# Patient Record
Sex: FEMALE | Race: OTHER | Employment: OTHER | URBAN - METROPOLITAN AREA
[De-identification: names, ages, dates, MRNs, and addresses within clinical notes are randomized per-mention and may not be internally consistent; named-entity substitution may affect disease eponyms.]

---

## 2017-01-24 ENCOUNTER — HOSPITAL ENCOUNTER (OUTPATIENT)
Dept: LAB | Age: 67
Discharge: HOME | End: 2017-01-24
Payer: MEDICARE

## 2017-01-24 ENCOUNTER — OFFICE VISIT (OUTPATIENT)
Dept: ONCOLOGY | Age: 67
End: 2017-01-24

## 2017-01-24 ENCOUNTER — HOSPITAL ENCOUNTER (OUTPATIENT)
Dept: ONCOLOGY | Age: 67
Discharge: HOME | End: 2017-01-24

## 2017-01-24 VITALS
SYSTOLIC BLOOD PRESSURE: 114 MMHG | HEIGHT: 62 IN | TEMPERATURE: 97.7 F | WEIGHT: 145 LBS | BODY MASS INDEX: 26.68 KG/M2 | HEART RATE: 44 BPM | DIASTOLIC BLOOD PRESSURE: 74 MMHG

## 2017-01-24 DIAGNOSIS — I86.4 GASTRIC VARICES: ICD-10-CM

## 2017-01-24 DIAGNOSIS — D68.69 ACQUIRED THROMBOPHILIA (HCC): ICD-10-CM

## 2017-01-24 DIAGNOSIS — I81 PORTAL VEIN THROMBOSIS: ICD-10-CM

## 2017-01-24 DIAGNOSIS — D68.59 PROTEIN C DEFICIENCY (HCC): ICD-10-CM

## 2017-01-24 DIAGNOSIS — K76.6 PORTAL HYPERTENSION WITH ESOPHAGEAL VARICES (HCC): ICD-10-CM

## 2017-01-24 DIAGNOSIS — I85.00 PORTAL HYPERTENSION WITH ESOPHAGEAL VARICES (HCC): ICD-10-CM

## 2017-01-24 DIAGNOSIS — D72.819 LEUKOPENIA, UNSPECIFIED TYPE: ICD-10-CM

## 2017-01-24 DIAGNOSIS — D72.819 CHRONIC LEUKOPENIA: ICD-10-CM

## 2017-01-24 DIAGNOSIS — D72.819 LEUKOPENIA, UNSPECIFIED TYPE: Primary | ICD-10-CM

## 2017-01-24 LAB
ALBUMIN SERPL BCP-MCNC: 4.3 G/DL (ref 3.4–5)
ALBUMIN/GLOB SERPL: 2 {RATIO} (ref 0.8–1.7)
ALP SERPL-CCNC: 113 U/L (ref 45–117)
ALT SERPL-CCNC: 51 U/L (ref 13–56)
ANION GAP BLD CALC-SCNC: 7 MMOL/L (ref 3–18)
AST SERPL W P-5'-P-CCNC: 32 U/L (ref 15–37)
BASO+EOS+MONOS # BLD AUTO: 0.1 K/UL (ref 0–2.3)
BASO+EOS+MONOS # BLD AUTO: 3 % (ref 0.1–17)
BILIRUB SERPL-MCNC: 1.1 MG/DL (ref 0.2–1)
BUN SERPL-MCNC: 14 MG/DL (ref 7–18)
BUN/CREAT SERPL: 22 (ref 12–20)
CALCIUM SERPL-MCNC: 9.1 MG/DL (ref 8.5–10.1)
CHLORIDE SERPL-SCNC: 105 MMOL/L (ref 100–108)
CO2 SERPL-SCNC: 27 MMOL/L (ref 21–32)
CREAT SERPL-MCNC: 0.64 MG/DL (ref 0.6–1.3)
DIFFERENTIAL METHOD BLD: ABNORMAL
ERYTHROCYTE [DISTWIDTH] IN BLOOD BY AUTOMATED COUNT: 13.3 % (ref 11.5–14.5)
GLOBULIN SER CALC-MCNC: 2.2 G/DL (ref 2–4)
GLUCOSE SERPL-MCNC: 99 MG/DL (ref 74–99)
HCT VFR BLD AUTO: 42.6 % (ref 36–48)
HGB BLD-MCNC: 13.8 G/DL (ref 12–16)
LYMPHOCYTES # BLD AUTO: 35 % (ref 14–44)
LYMPHOCYTES # BLD: 1.5 K/UL (ref 1.1–5.9)
MCH RBC QN AUTO: 29.1 PG (ref 25–35)
MCHC RBC AUTO-ENTMCNC: 32.4 G/DL (ref 31–37)
MCV RBC AUTO: 89.9 FL (ref 78–102)
NEUTS SEG # BLD: 2.6 K/UL (ref 1.8–9.5)
NEUTS SEG NFR BLD AUTO: 62 % (ref 40–70)
PLATELET # BLD AUTO: 252 K/UL (ref 140–440)
POTASSIUM SERPL-SCNC: 4.6 MMOL/L (ref 3.5–5.5)
PROT SERPL-MCNC: 6.5 G/DL (ref 6.4–8.2)
RBC # BLD AUTO: 4.74 M/UL (ref 4.1–5.1)
SODIUM SERPL-SCNC: 139 MMOL/L (ref 136–145)
WBC # BLD AUTO: 4.2 K/UL (ref 4.5–13)

## 2017-01-24 PROCEDURE — 80053 COMPREHEN METABOLIC PANEL: CPT | Performed by: INTERNAL MEDICINE

## 2017-01-24 PROCEDURE — 36415 COLL VENOUS BLD VENIPUNCTURE: CPT | Performed by: INTERNAL MEDICINE

## 2017-01-24 NOTE — PROGRESS NOTES
Hematology/Oncology  Progress Note    Name: Berna Shaikh  Date: 2017  : 1950    PCP: Waleska Bethea M.D.     Ms. Hemant Arauz is a 74year old female who was seen for management of her acquired thrombophilic disorder. The patient also has portal hypertension with esophageal varices, coronary artery disease, and borderline diabetes mellitus. Current therapy: None    Subjective:     Mrs. Hemant Arauz is a 58-year-old [facility] [place] woman who has a thrombophilia disorder characterized by decreased and her protein C antigen and functional protein C levels. She also has a history of portal vein thrombosis. She has been doing well since she was last seen in clinic. At this time, she denies having abdominal pain, nausea, emesis, or diarrhea. She has multiple medical problems and is concerned about her cholesterol level, thyroid function,, and blood sugar level. She has previously had a myocardial infarction and is very interested in her lipid profile. The patient  also has a history of TIA as well. 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 Today she is complaining of some recurrent lower abdominal pain which extends from the left lower quadrant over to the right lower quadrant. She is currently taking Bentyl and states that she gets the best relief from using extra strength Tylenol. She is tentatively scheduled to have a followup visit with the gastroenterologist for further assessment. She denies having a blood in the stool. There are no other complaints or concerns reported. No past medical history on file. Past Surgical History   Procedure Laterality Date    Hx acl reconstruction       Social History     Social History    Marital status:      Spouse name: N/A    Number of children: N/A    Years of education: N/A     Occupational History    Not on file.      Social History Main Topics    Smoking status: Never Smoker  Smokeless tobacco: Never Used    Alcohol use Yes      Comment: ocassionally    Drug use: Not on file    Sexual activity: Not on file     Other Topics Concern    Not on file     Social History Narrative    No narrative on file     No family history on file. Current Outpatient Prescriptions   Medication Sig Dispense Refill    atorvastatin (LIPITOR) 20 mg tablet Take  by mouth daily.  DOCOSAHEXANOIC ACID/EPA (FISH OIL PO) Take  by mouth.  Flaxseed Oil Oil by Does Not Apply route.  cyanocobalamin (VITAMIN B12) 1,000 mcg/mL injection 1,000 mcg by IntraMUSCular route once.  cholecalciferol, vitamin D3, (VITAMIN D3) 2,000 unit Tab Take  by mouth.  aspirin 81 mg chewable tablet Take 81 mg by mouth daily.  montelukast (SINGULAIR) 10 mg tablet Take 10 mg by mouth daily.  metoprolol-XL (TOPROL XL) 25 mg XL tablet Take  by mouth daily.  docusate sodium (COLACE) 100 mg capsule Take 100 mg by mouth two (2) times a day. Review of Systems  Constitutional: The patient has no acute distress or discomfort. HEENT: The patient denies recent head trauma, eye pain, blurred vision,  hearing deficit, oropharyngeal mucosal pain or lesions, and the patient denies throat pain or discomfort. Lymphatics: The patient denies palpable peripheral lymphadenopathy. Hematologic: The patient denies having bruising, bleeding, or progressive fatigue. Respiratory: Patient denies having shortness of breath, cough, sputum production, fever, or dyspnea on exertion. Cardiovascular: The patient denies having leg pain, leg swelling, heart palpitations, chest permit, chest pain, or lightheadedness. The patient denies having dyspnea on exertion. Gastrointestinal: The patient denies having nausea, emesis, or diarrhea. The patient denies having any hematemesis or blood in the stool. Genitourinary: Patient denies having urinary urgency, frequency, or dysuria.   The patient denies having blood in the urine. Psychological: The patient denies having symptoms of nervousness, anxiety, depression, or thoughts of harming himself some of this. Skin: Patient denies having skin rashes, skin, ulcerations, or unexplained itching or pruritus. Musculoskeletal: The patient denies having pain in the joints, tenderness, or bones. Objective:     Visit Vitals    /74    Pulse (!) 44    Temp 97.7 °F (36.5 °C)    Ht 5' 2\" (1.575 m)    Wt 65.8 kg (145 lb)    BMI 26.52 kg/m2     ECOG PS=0     Physical Exam:   Gen. Appearance: The patient is in no acute distress. Skin: There is no bruise or rash. HEENT: The exam is unremarkable. Neck: Supple without lymphadenopathy or thyromegaly. Lungs: Clear to auscultation and percussion; there are no wheezes or rhonchi. Heart: Regular rate and rhythm; there are no murmurs, gallops, or rubs. Anterior chest wall and breast: Deferred. Abdomen: Bowel sounds are present and normal.  There is no guarding, tenderness, or hepatosplenomegaly. Extremities: There is no clubbing, cyanosis, or edema. Neurologic: There are no focal neurologic deficits. Lymphatics: There is no palpable peripheral lymphadenopathy. Lab data: The CBC from from today, 1/24/2017, showed a WBC count of 4.2, hemoglobin 13.8 g/dL, hematocrit 42.6%, and the platelet count was 455,106. Assessment:     1. Leukopenia, unspecified type    2. Portal vein thrombosis    3. Chronic leukopenia    4. Protein C deficiency ([address]    5. Acquired thrombophilia ([facility] [address]      The most recent protein C antigen level was 70%    Plan:   Portal vein thrombosis/hypertension with esophageal varices: The clinical exam shows no evidence of recurrent pain or discomfort to suggest any evidence of recurrent splanchnic vessel thrombosis. A comprehensive metabolic panel with liver function enzymes will be checked at this time.     Thrombophilia/acquired thrombophilia/protein C deficiency: The studies  indicate that she has an absolute protein C deficiency based on the most recent lab data. I have explained to the patient that if she develops  Any type of recurrent thromboembolic events, she will need to remain on anticoagulation indefinitely from that point on. Recent CT scan of her abdomen did not show any evidence of blood clots. The most recent lab tests from January 26, 2016 showed that her protein C level had increased to 70%. Gastric/esophageal varices(stable problem): The patient previously had an evaluation by the gastroenterologist and the variceal findings were without evidence of bleeding. Leukopenia : I have explained to the patient that her previously recognized leukopenia has resolved with the current WBC count in the normal range at 4.7 with an absolute neutrophil count of 3.3. Absolute lymphocyte count remains low at 1%. The flow cytometry from January 26, 2016 revealed no evidence of immunophenotypic abnormalities and the leukocyte populations. We will continue to monitor the leukocyte counts at six-month intervals in the future. Abdominal discomfort (new problem): The patient is currently taking Bentyl and using Tylenol extra strength with some relief. I have encouraged the patient to follow with the gastroenterologist for further assessment.     The patient will return to the clinic for a complete assessment again in 6 months  Orders Placed This Encounter    COMPLETE CBC & AUTO DIFF WBC    InHouse CBC ([employer])     Standing Status:   Future     Number of Occurrences:   1     Standing Expiration Date:   1/31/2017       Gilmar Lebron MD  1/24/2017

## 2017-08-22 ENCOUNTER — HOSPITAL ENCOUNTER (OUTPATIENT)
Dept: ONCOLOGY | Age: 67
Discharge: HOME | End: 2017-08-22

## 2017-08-22 ENCOUNTER — OFFICE VISIT (OUTPATIENT)
Dept: ONCOLOGY | Age: 67
End: 2017-08-22

## 2017-08-22 VITALS
HEART RATE: 45 BPM | DIASTOLIC BLOOD PRESSURE: 64 MMHG | TEMPERATURE: 98.1 F | WEIGHT: 146 LBS | SYSTOLIC BLOOD PRESSURE: 120 MMHG | BODY MASS INDEX: 26.7 KG/M2

## 2017-08-22 DIAGNOSIS — D72.819 CHRONIC LEUKOPENIA: ICD-10-CM

## 2017-08-22 DIAGNOSIS — D72.819 LEUKOPENIA, UNSPECIFIED TYPE: Primary | ICD-10-CM

## 2017-08-22 DIAGNOSIS — D68.59 PROTEIN C DEFICIENCY (HCC): ICD-10-CM

## 2017-08-22 DIAGNOSIS — I81 PORTAL VEIN THROMBOSIS: ICD-10-CM

## 2017-08-22 DIAGNOSIS — D72.819 LEUKOPENIA, UNSPECIFIED TYPE: ICD-10-CM

## 2017-08-22 LAB
BASO+EOS+MONOS # BLD AUTO: 0.6 K/UL (ref 0–2.3)
BASO+EOS+MONOS # BLD AUTO: 10 % (ref 0.1–17)
DIFFERENTIAL METHOD BLD: NORMAL
ERYTHROCYTE [DISTWIDTH] IN BLOOD BY AUTOMATED COUNT: 13.3 % (ref 11.5–14.5)
HCT VFR BLD AUTO: 43.4 % (ref 36–48)
HGB BLD-MCNC: 14 G/DL (ref 12–16)
LYMPHOCYTES # BLD: 1.3 K/UL (ref 1.1–5.9)
LYMPHOCYTES NFR BLD: 24 % (ref 14–44)
MCH RBC QN AUTO: 28.9 PG (ref 25–35)
MCHC RBC AUTO-ENTMCNC: 32.3 G/DL (ref 31–37)
MCV RBC AUTO: 89.7 FL (ref 78–102)
NEUTS SEG # BLD: 3.7 K/UL (ref 1.8–9.5)
NEUTS SEG NFR BLD: 66 % (ref 40–70)
PLATELET # BLD AUTO: 266 K/UL (ref 140–440)
RBC # BLD AUTO: 4.84 M/UL (ref 4.1–5.1)
WBC # BLD AUTO: 5.6 K/UL (ref 4.5–13)

## 2017-08-22 NOTE — PROGRESS NOTES
Hematology/Oncology  Progress Note    Name: Davian Martinez  Date: 2017  : 1950    PCP: Karsten Hogue M.D.     Ms. Luz Elena Oneil is a 74year old female who was seen for management of her acquired thrombophilic disorder. The patient also has portal hypertension with esophageal varices, coronary artery disease, and borderline diabetes mellitus. She also has a documented protein C deficiency  Current therapy: None    Subjective:     Mrs. Luz Elena Oneil is a 12-year-old [facility] [place] woman who has a thrombophilia disorder characterized by decreased and her protein C antigen and functional protein C levels. She also has a history of portal vein thrombosis. She has been doing well since she was last seen in clinic. Currently, she denies having abdominal pain, nausea, emesis, or diarrhea. She has multiple medical problems and is concerned about her cholesterol level, thyroid function,, and blood sugar level. She has previously had a myocardial infarction and is very interested in her lipid profile. The patient  also has a history of TIA as well. 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 Today she is reporting no new complaints. She reports that her energy level has been good and physically she has no complaints to report. No past medical history on file. Past Surgical History:   Procedure Laterality Date    HX ACL RECONSTRUCTION       Social History     Social History    Marital status:      Spouse name: N/A    Number of children: N/A    Years of education: N/A     Occupational History    Not on file.      Social History Main Topics    Smoking status: Never Smoker    Smokeless tobacco: Never Used    Alcohol use Yes      Comment: ocassionally    Drug use: Not on file    Sexual activity: Not on file     Other Topics Concern    Not on file     Social History Narrative    No narrative on file     No family history on file.  Current Outpatient Prescriptions   Medication Sig Dispense Refill    atorvastatin (LIPITOR) 20 mg tablet Take  by mouth daily.  DOCOSAHEXANOIC ACID/EPA (FISH OIL PO) Take  by mouth.  Flaxseed Oil Oil by Does Not Apply route.  cyanocobalamin (VITAMIN B12) 1,000 mcg/mL injection 1,000 mcg by IntraMUSCular route once.  cholecalciferol, vitamin D3, (VITAMIN D3) 2,000 unit Tab Take  by mouth.  aspirin 81 mg chewable tablet Take 81 mg by mouth daily.  montelukast (SINGULAIR) 10 mg tablet Take 10 mg by mouth daily.  metoprolol-XL (TOPROL XL) 25 mg XL tablet Take  by mouth daily.  docusate sodium (COLACE) 100 mg capsule Take 100 mg by mouth two (2) times a day. Review of Systems  Constitutional: The patient has no acute distress or discomfort. HEENT: The patient denies recent head trauma, eye pain, blurred vision,  hearing deficit, oropharyngeal mucosal pain or lesions, and the patient denies throat pain or discomfort. Lymphatics: The patient denies palpable peripheral lymphadenopathy. Hematologic: The patient denies having bruising, bleeding, or progressive fatigue. Respiratory: Patient denies having shortness of breath, cough, sputum production, fever, or dyspnea on exertion. Cardiovascular: The patient denies having leg pain, leg swelling, heart palpitations, chest permit, chest pain, or lightheadedness. The patient denies having dyspnea on exertion. Gastrointestinal: The patient denies having nausea, emesis, or diarrhea. The patient denies having any hematemesis or blood in the stool. Genitourinary: Patient denies having urinary urgency, frequency, or dysuria. The patient denies having blood in the urine. Psychological: The patient denies having symptoms of nervousness, anxiety, depression, or thoughts of harming himself some of this.   Skin: Patient denies having skin rashes, skin, ulcerations, or unexplained itching or pruritus. Musculoskeletal: The patient denies having pain in the joints, tenderness, or bones. Objective:     Visit Vitals    /64 (BP 1 Location: Left arm, BP Patient Position: Sitting)    Pulse (!) 45    Temp 98.1 °F (36.7 °C) (Oral)    Wt 66.2 kg (146 lb)    BMI 26.7 kg/m2     ECOG PS=0     Physical Exam:   Gen. Appearance: The patient is in no acute distress. Skin: There is no bruise or rash. HEENT: The exam is unremarkable. Neck: Supple without lymphadenopathy or thyromegaly. Lungs: Clear to auscultation and percussion; there are no wheezes or rhonchi. Heart: Regular rate and rhythm; there are no murmurs, gallops, or rubs. Anterior chest wall and breast: Deferred. Abdomen: Bowel sounds are present and normal.  There is no guarding, tenderness, or hepatosplenomegaly. Extremities: There is no clubbing, cyanosis, or edema. Neurologic: There are no focal neurologic deficits. Lymphatics: There is no palpable peripheral lymphadenopathy. Lab data: The CBC from from today, 8/22/2017 shows a WBC count of 5.6, hemoglobin 14 g/dL, hematocrit 43.4%, and the platelet count is 755,195. Assessment:     1. Leukopenia, unspecified type    2. Portal vein thrombosis    3. Chronic leukopenia    4. Protein C deficiency ([facility] [address]      The most recent protein C antigen level was 60 %    Plan:   Portal vein thrombosis/hypertension with esophageal varices: The clinical exam shows no evidence of recurrent pain or discomfort to suggest any evidence of recurrent splanchnic vessel thrombosis. A comprehensive metabolic panel with liver function enzymes will be checked at this time. Thrombophilia/acquired thrombophilia/protein C deficiency: The studies  indicate that she has an absolute protein C deficiency based on the most recent lab data, from 7/26/2016.   I have explained to the patient that if she develops  Any type of recurrent thromboembolic events, she will need to remain on anticoagulation indefinitely from that point on. The most recent CT scan of her abdomen did not show any evidence of blood clots. The most recent lab tests, from 7/26/2016, show an active protein C level of 60%. Gastric/esophageal varices(stable problem): The patient previously had an evaluation by the gastroenterologist and the variceal findings were without evidence of bleeding. Leukopenia : I have explained to the patient that her previously recognized leukopenia has resolved with the current WBC count in the normal range at 5.6 with an absolute neutrophil count of 3.7. Absolute lymphocyte count remains low at 1.3 %. The flow cytometry from January 26, 2016 revealed no evidence of immunophenotypic abnormalities and the leukocyte populations. We will continue to monitor the leukocyte counts at six-month intervals in the future. Abdominal discomfort (new problem): The patient is currently taking Bentyl and using Tylenol extra strength with some relief. I have encouraged the patient to follow with the gastroenterologist for further assessment.     The patient will return to the clinic for a complete assessment again in 6 months  Orders Placed This Encounter    COMPLETE CBC & AUTO DIFF WBC    InHouse CBC ([employer])     Standing Status:   Future     Number of Occurrences:   1     Standing Expiration Date:   8/29/2017       Omayra Patricio MD  8/22/2017

## 2017-08-22 NOTE — PATIENT INSTRUCTIONS
Clotting Factor Deficiencies: Care Instructions  Your Care Instructions    Clotting factors are substances in the blood that help stop bleeding after a cut or injury. They also prevent sudden bleeding. In people who have clotting factor problems, the clotting factors don't work right or, in some cases, are missing. When blood does not clot well, even minor injuries can cause serious bleeding. This can lead to blood loss, injury to internal organs, or damage to muscles or joints. Several conditions, including hemophilia, can make it hard for the blood to clot. Your doctor can treat you by giving you replacement clotting factors. You also may take medicine to prevent bleeding. You may often have clotting factors transfused into a vein to prevent bleeding, or you may get them as needed. You may eventually learn to do this at home. You can also try to prevent injuries that can cause you to bleed. Follow-up care is a key part of your treatment and safety. Be sure to make and go to all appointments, and call your doctor if you are having problems. It's also a good idea to know your test results and keep a list of the medicines you take. How can you care for yourself at home? · Take your medicines exactly as prescribed. Call your doctor if you think you are having a problem with your medicine. You will get more details on the specific medicines your doctor prescribes. · Stay at a healthy body weight. If you are overweight, the additional stress on joints can trigger bleeding. · Exercise safely. Avoid contact sports. Swim or walk to avoid excess pressure on your joints. Check with your doctor before doing activities that put you at high risk for falls, such as riding a bike. · Brush and floss your teeth daily. This may help you avoid problems that could lead to having a tooth pulled. · Avoid aspirin and nonsteroidal anti-inflammatory drugs (NSAIDs), such as ibuprofen (Advil, Motrin) or naproxen (Aleve).  They can increase the chance of bleeding. · Take pain medicines exactly as directed. ¨ If the doctor gave you a prescription medicine for pain, take it as prescribed. ¨ If you are not taking a prescription pain medicine, ask your doctor if you can take an over-the-counter medicine. · Take care to prevent accidents at home:  ¨ Make sure rugs are tacked down so you do not slip. ¨ Keep furniture with sharp edges out of pathways. ¨ Use nonskid floor wax. ¨ Wipe up spills quickly. ¨ If you live in an area that gets snow and ice in the winter, sprinkle salt on steps and sidewalks. ¨ Avoid loose-fitting shoes. You might lose your balance and fall. · Wear medical alert jewelry that lists your clotting problem. You can buy this at most drugstores. When should you call for help? Call 911 anytime you think you may need emergency care. For example, call if:  · You passed out (lost consciousness). · You have a head injury. · You have sudden, severe pain, especially in a joint. · You have a sudden, severe headache that is different from past headaches. Call your doctor now or seek immediate medical care if:  · You are dizzy or lightheaded, or you feel like you may faint. · You are unable to stop bleeding by giving clotting factors after an injury. · You have an injury but are not sure whether you need treatment. · You have any abnormal bleeding, such as:  ¨ Nosebleeds. ¨ Vaginal bleeding that is different (heavier, more frequent, at a different time of the month) than what you are used to. ¨ Bloody or black stools, or rectal bleeding. ¨ Bloody or pink urine. Watch closely for changes in your health, and be sure to contact your doctor if:  · You have joint pain or swelling. Where can you learn more? Go to http://mallorie-corry.info/. Enter Q448 in the search box to learn more about \"Clotting Factor Deficiencies: Care Instructions. \"  Current as of: October 13, 2016  Content Version: 11.3  © 6118-0859 [employer]. Care instructions adapted under license by [employer] (which disclaims liability or warranty for this information). If you have questions about a medical condition or this instruction, always ask your healthcare professional. [place] [address] any warranty or liability for your use of this information.

## 2017-08-22 NOTE — MR AVS SNAPSHOT
Visit Information Date & Time Provider Department Dept. Phone Encounter #  
 8/22/2017  2:15 PM Elvia Garcia [address] Office [phone] [id number] Follow-up Instructions Return in about 6 months (around 2/22/2018). Your Appointments 2/20/2018 10:15 AM  
Office Visit with Elvia Garcia MD  
[address] [facility]) Appt Note: OV  
 [facility] [address] [place] [address]  
[phone]  
  
   
 [facility] [address] Upcoming Health Maintenance Date Due Hepatitis C Screening 1950 DTaP/Tdap/Td series (1 - Tdap) 11/23/1969 BREAST CANCER SCRN MAMMOGRAM 11/23/1998 FOBT Q 1 YEAR AGE 50-75 11/23/1998 ZOSTER VACCINE AGE 60> 9/23/2008 GLAUCOMA SCREENING Q2Y 11/23/2013 Pneumococcal 65+ High/Highest Risk (1 of 2 - PCV13) 11/23/2013 MEDICARE YEARLY EXAM 11/23/2013 INFLUENZA AGE 9 TO ADULT 8/1/2017 Allergies as of 8/22/2017  Review Complete On: 7/28/2015 By: Elvia Garcia MD  
  
 Severity Noted Reaction Type Reactions Demerol [Meperidine] Medium 08/18/2011    Nausea and Vomiting Low to awake from sleeping Fentanyl Medium 08/18/2011    Nausea and Vomiting Low to awake from sleeping Current Immunizations  Never Reviewed No immunizations on file. Not reviewed this visit You Were Diagnosed With   
  
 Codes Comments Leukopenia, unspecified type    -  Primary ICD-10-CM: D72.819 ICD-9-CM: 288.50 Portal vein thrombosis     ICD-10-CM: F50 
ICD-9-CM: 364 Chronic leukopenia     ICD-10-CM: D72.819 ICD-9-CM: 288.50 Protein C deficiency ([facility] [address]     ICD-10-CM: M08.53 
ICD-9-CM: 289.81 Vitals BP Pulse Temp Weight(growth percentile) BMI Smoking Status 120/64 (BP 1 Location: Left arm, BP Patient Position: Sitting) (!) 45 98.1 °F (36.7 °C) (Oral) 146 lb (66.2 kg) 26.7 kg/m2 Never Smoker BMI and BSA Data Body Mass Index Body Surface Area  
 26.7 kg/m 2 1.7 m 2 Your Updated Medication List  
  
   
This list is accurate as of: 8/22/17  3:16 PM.  Always use your most recent med list.  
  
  
  
  
 aspirin 81 mg chewable tablet Take 81 mg by mouth daily. COLACE 100 mg capsule Generic drug:  docusate sodium Take 100 mg by mouth two (2) times a day. cyanocobalamin 1,000 mcg/mL injection Commonly known as:  VITAMIN B12  
1,000 mcg by IntraMUSCular route once. FISH OIL PO Take  by mouth. Flaxseed Oil Oil  
by Does Not Apply route. LIPITOR 20 mg tablet Generic drug:  atorvastatin Take  by mouth daily. SINGULAIR 10 mg tablet Generic drug:  montelukast  
Take 10 mg by mouth daily. TOPROL XL 25 mg XL tablet Generic drug:  metoprolol succinate Take  by mouth daily. VITAMIN D3 2,000 unit Tab Generic drug:  cholecalciferol (vitamin D3) Take  by mouth. We Performed the Following COMPLETE CBC & AUTO DIFF WBC [24192 CPT(R)] Follow-up Instructions Return in about 6 months (around 2/22/2018). To-Do List   
 08/22/2017 Lab:  CBC WITH 3 PART DIFF   
  
 08/22/2017 Lab:  METABOLIC PANEL, COMPREHENSIVE   
  
 08/23/2017 Lab:  PROTEIN C ACTIVITY   
  
 08/23/2017 Lab:  PROTEIN C, TOTAL Patient Instructions Clotting Factor Deficiencies: Care Instructions Your Care Instructions Clotting factors are substances in the blood that help stop bleeding after a cut or injury. They also prevent sudden bleeding. In people who have clotting factor problems, the clotting factors don't work right or, in some cases, are missing. When blood does not clot well, even minor injuries can cause serious bleeding. This can lead to blood loss, injury to internal organs, or damage to muscles or joints.  
Several conditions, including hemophilia, can make it hard for the blood to clot. Your doctor can treat you by giving you replacement clotting factors. You also may take medicine to prevent bleeding. You may often have clotting factors transfused into a vein to prevent bleeding, or you may get them as needed. You may eventually learn to do this at home. You can also try to prevent injuries that can cause you to bleed. Follow-up care is a key part of your treatment and safety. Be sure to make and go to all appointments, and call your doctor if you are having problems. It's also a good idea to know your test results and keep a list of the medicines you take. How can you care for yourself at home? · Take your medicines exactly as prescribed. Call your doctor if you think you are having a problem with your medicine. You will get more details on the specific medicines your doctor prescribes. · Stay at a healthy body weight. If you are overweight, the additional stress on joints can trigger bleeding. · Exercise safely. Avoid contact sports. Swim or walk to avoid excess pressure on your joints. Check with your doctor before doing activities that put you at high risk for falls, such as riding a bike. · Brush and floss your teeth daily. This may help you avoid problems that could lead to having a tooth pulled. · Avoid aspirin and nonsteroidal anti-inflammatory drugs (NSAIDs), such as ibuprofen (Advil, Motrin) or naproxen (Aleve). They can increase the chance of bleeding. · Take pain medicines exactly as directed. ¨ If the doctor gave you a prescription medicine for pain, take it as prescribed. ¨ If you are not taking a prescription pain medicine, ask your doctor if you can take an over-the-counter medicine. · Take care to prevent accidents at home: ¨ Make sure rugs are tacked down so you do not slip. ¨ Keep furniture with sharp edges out of pathways. ¨ Use nonskid floor wax. ¨ Wipe up spills quickly.  
¨ If you live in an area that gets snow and ice in the winter, sprinkle salt on steps and sidewalks. ¨ Avoid loose-fitting shoes. You might lose your balance and fall. · Wear medical alert jewelry that lists your clotting problem. You can buy this at most drugstores. When should you call for help? Call 911 anytime you think you may need emergency care. For example, call if: 
· You passed out (lost consciousness). · You have a head injury. · You have sudden, severe pain, especially in a joint. · You have a sudden, severe headache that is different from past headaches. Call your doctor now or seek immediate medical care if: 
· You are dizzy or lightheaded, or you feel like you may faint. · You are unable to stop bleeding by giving clotting factors after an injury. · You have an injury but are not sure whether you need treatment. · You have any abnormal bleeding, such as: 
¨ Nosebleeds. ¨ Vaginal bleeding that is different (heavier, more frequent, at a different time of the month) than what you are used to. ¨ Bloody or black stools, or rectal bleeding. ¨ Bloody or pink urine. Watch closely for changes in your health, and be sure to contact your doctor if: 
· You have joint pain or swelling. Where can you learn more? Go to http://mallorie-corry.info/. Enter W228 in the search box to learn more about \"Clotting Factor Deficiencies: Care Instructions. \" Current as of: October 13, 2016 Content Version: 11.3 © 0599-5615 Communicado. Care instructions adapted under license by [employer] (which disclaims liability or warranty for this information). If you have questions about a medical condition or this instruction, always ask your healthcare professional. [address] any warranty or liability for your use of this information. Introducing [facility]! Dear Rosibel Ariza: Thank you for requesting a [employer] account.   Our records indicate that you already have an active [employer] account. You can access your account anytime at [email or web address] Did you know that you can access your hospital and ER discharge instructions at any time in [employer]? You can also review all of your test results from your hospital stay or ER visit. Additional Information If you have questions, please visit the Frequently Asked Questions section of the [employer] website at [email or web address] Remember, [employer] is NOT to be used for urgent needs. For medical emergencies, dial 911. Now available from your iPhone and Android! Please provide this summary of care documentation to your next provider. Your primary care clinician is listed as Kizzy Boss. If you have any questions after today's visit, please call [phone].

## 2017-08-28 ENCOUNTER — TELEPHONE (OUTPATIENT)
Dept: ONCOLOGY | Age: 67
End: 2017-08-28

## 2017-08-28 NOTE — TELEPHONE ENCOUNTER
Patient said someone called her Friday and she has been nervous all weekend wondering why we called. She said please put any lab work results on [facility]" so she can see it. If you call her she is at [phone].

## 2017-08-30 LAB
A-G RATIO,AGRAT: 1.8 RATIO (ref 1.1–2.6)
ALBUMIN SERPL-MCNC: 4.4 G/DL (ref 3.5–5)
ALP SERPL-CCNC: 84 U/L (ref 40–120)
ALT SERPL-CCNC: 47 U/L (ref 5–40)
ANION GAP SERPL CALC-SCNC: 13.2 MMOL/L
AST SERPL W P-5'-P-CCNC: 37 U/L (ref 10–37)
BILIRUB SERPL-MCNC: 0.6 MG/DL (ref 0.2–1.2)
BUN SERPL-MCNC: 10 MG/DL (ref 6–22)
CALCIUM SERPL-MCNC: 9.4 MG/DL (ref 8.4–10.5)
CHLORIDE SERPL-SCNC: 105 MMOL/L (ref 98–110)
CO2 SERPL-SCNC: 25 MMOL/L (ref 20–32)
CREAT SERPL-MCNC: 0.4 MG/DL (ref 0.8–1.4)
GFRAA, 66117: >60
GFRNA, 66118: >60
GLOBULIN,GLOB: 2.5 G/DL (ref 2–4)
GLUCOSE SERPL-MCNC: 105 MG/DL (ref 65–99)
POTASSIUM SERPL-SCNC: 4.3 MMOL/L (ref 3.5–5.5)
PROT SERPL-MCNC: 6.9 G/DL (ref 6.2–8.1)
SODIUM SERPL-SCNC: 143 MMOL/L (ref 133–145)

## 2017-08-31 LAB — PROTEIN C ANTIGEN 500535: 55 %

## 2017-09-06 LAB
PROTEIN C ACTIVITY,PCACG: 70.5 % (ref 60–150)
PROTEIN C ANTIGEN, 080465: 64 % (ref 60–150)

## 2018-03-07 ENCOUNTER — TELEPHONE (OUTPATIENT)
Dept: ONCOLOGY | Age: 68
End: 2018-03-07

## 2018-03-07 NOTE — TELEPHONE ENCOUNTER
Dr. Doris Villalta called and wanted to inform Dr. Rafael Villasenor that she has some new lumps under her right armpit, and she just didn't want to spring it on him at her follow up appt on April 10,2018.

## 2018-03-08 ENCOUNTER — TELEPHONE (OUTPATIENT)
Dept: ONCOLOGY | Age: 68
End: 2018-03-08

## 2018-03-09 ENCOUNTER — OFFICE VISIT (OUTPATIENT)
Dept: ONCOLOGY | Age: 68
End: 2018-03-09

## 2018-03-09 ENCOUNTER — HOSPITAL ENCOUNTER (OUTPATIENT)
Dept: LAB | Age: 68
Discharge: HOME | End: 2018-03-09
Payer: MEDICARE

## 2018-03-09 ENCOUNTER — HOSPITAL ENCOUNTER (OUTPATIENT)
Dept: ONCOLOGY | Age: 68
Discharge: HOME | End: 2018-03-09

## 2018-03-09 VITALS
WEIGHT: 146 LBS | HEART RATE: 57 BPM | TEMPERATURE: 97.8 F | BODY MASS INDEX: 26.7 KG/M2 | SYSTOLIC BLOOD PRESSURE: 126 MMHG | DIASTOLIC BLOOD PRESSURE: 63 MMHG

## 2018-03-09 DIAGNOSIS — I81 PORTAL VEIN THROMBOSIS: ICD-10-CM

## 2018-03-09 DIAGNOSIS — D72.819 CHRONIC LEUKOPENIA: ICD-10-CM

## 2018-03-09 DIAGNOSIS — D72.819 LEUKOPENIA, UNSPECIFIED TYPE: ICD-10-CM

## 2018-03-09 DIAGNOSIS — D68.69 ACQUIRED THROMBOPHILIA (HCC): ICD-10-CM

## 2018-03-09 DIAGNOSIS — R59.0 LYMPHADENOPATHY, AXILLARY: Primary | ICD-10-CM

## 2018-03-09 DIAGNOSIS — D68.59 PROTEIN C DEFICIENCY (HCC): ICD-10-CM

## 2018-03-09 DIAGNOSIS — I86.4 GASTRIC VARICES: ICD-10-CM

## 2018-03-09 LAB
ALBUMIN SERPL-MCNC: 4.3 G/DL (ref 3.4–5)
ALBUMIN/GLOB SERPL: 1.7 {RATIO} (ref 0.8–1.7)
ALP SERPL-CCNC: 102 U/L (ref 45–117)
ALT SERPL-CCNC: 46 U/L (ref 13–56)
ANION GAP SERPL CALC-SCNC: 5 MMOL/L (ref 3–18)
AST SERPL-CCNC: 30 U/L (ref 15–37)
BASO+EOS+MONOS # BLD AUTO: 0.5 K/UL (ref 0–2.3)
BASO+EOS+MONOS # BLD AUTO: 12 % (ref 0.1–17)
BILIRUB SERPL-MCNC: 1.4 MG/DL (ref 0.2–1)
BUN SERPL-MCNC: 15 MG/DL (ref 7–18)
BUN/CREAT SERPL: 22 (ref 12–20)
CALCIUM SERPL-MCNC: 9.3 MG/DL (ref 8.5–10.1)
CHLORIDE SERPL-SCNC: 107 MMOL/L (ref 100–108)
CO2 SERPL-SCNC: 29 MMOL/L (ref 21–32)
CREAT SERPL-MCNC: 0.67 MG/DL (ref 0.6–1.3)
DIFFERENTIAL METHOD BLD: NORMAL
ERYTHROCYTE [DISTWIDTH] IN BLOOD BY AUTOMATED COUNT: 13.7 % (ref 11.5–14.5)
GLOBULIN SER CALC-MCNC: 2.6 G/DL (ref 2–4)
GLUCOSE SERPL-MCNC: 85 MG/DL (ref 74–99)
HCT VFR BLD AUTO: 41.3 % (ref 36–48)
HGB BLD-MCNC: 13.5 G/DL (ref 12–16)
LYMPHOCYTES # BLD: 1.4 K/UL (ref 1.1–5.9)
LYMPHOCYTES NFR BLD: 31 % (ref 14–44)
MCH RBC QN AUTO: 29.7 PG (ref 25–35)
MCHC RBC AUTO-ENTMCNC: 32.7 G/DL (ref 31–37)
MCV RBC AUTO: 91 FL (ref 78–102)
NEUTS SEG # BLD: 2.8 K/UL (ref 1.8–9.5)
NEUTS SEG NFR BLD: 58 % (ref 40–70)
PLATELET # BLD AUTO: 261 K/UL (ref 140–440)
POTASSIUM SERPL-SCNC: 4.5 MMOL/L (ref 3.5–5.5)
PROT SERPL-MCNC: 6.9 G/DL (ref 6.4–8.2)
RBC # BLD AUTO: 4.54 M/UL (ref 4.1–5.1)
SODIUM SERPL-SCNC: 141 MMOL/L (ref 136–145)
WBC # BLD AUTO: 4.7 K/UL (ref 4.5–13)

## 2018-03-09 PROCEDURE — 80053 COMPREHEN METABOLIC PANEL: CPT | Performed by: INTERNAL MEDICINE

## 2018-03-09 PROCEDURE — 36415 COLL VENOUS BLD VENIPUNCTURE: CPT | Performed by: INTERNAL MEDICINE

## 2018-03-09 NOTE — PATIENT INSTRUCTIONS
Swollen Lymph Nodes: Care Instructions  Your Care Instructions    Lymph nodes are small, bean-shaped glands throughout the body. They help your body fight germs and infections. Lymph nodes often swell when there is a problem such as an injury, infection, or tumor. · The nodes in your neck, under your chin, or behind your ears may swell when you have a cold or sore throat. · An injury or infection in a leg or foot can make the nodes in your groin swell. · Sometimes medicine can make lymph nodes swell, but this is rare. Treatment depends on what caused your nodes to swell. Usually the nodes return to normal size without a problem. Follow-up care is a key part of your treatment and safety. Be sure to make and go to all appointments, and call your doctor if you are having problems. It's also a good idea to know your test results and keep a list of the medicines you take. How can you care for yourself at home? · Take your medicines exactly as prescribed. Call your doctor if you think you are having a problem with your medicine. · Avoid irritation. ¨ Do not squeeze or pick at the lump. ¨ Do not stick a needle in it. · Prevent infection. Do not squeeze, drain, or puncture a painful lump. Doing this can irritate or inflame the lump, push any existing infection deeper into the skin, or cause severe bleeding. · Get extra rest. Slow down just a little from your usual routine. · Drink plenty of fluids, enough so that your urine is light yellow or clear like water. If you have kidney, heart, or liver disease and have to limit fluids, talk with your doctor before you increase the amount of fluids you drink. · Take an over-the-counter pain medicine, such as acetaminophen (Tylenol), ibuprofen (Advil, Motrin), or naproxen (Aleve). Read and follow all instructions on the label. · Do not take two or more pain medicines at the same time unless the doctor told you to.  Many pain medicines have acetaminophen, which is Tylenol. Too much acetaminophen (Tylenol) can be harmful. When should you call for help? Call your doctor now or seek immediate medical care if:  ? · You have worse symptoms of infection, such as:  ¨ Increased pain, swelling, warmth, or redness. ¨ Red streaks leading from the area. ¨ Pus draining from the area. ¨ A fever. ? Watch closely for changes in your health, and be sure to contact your doctor if:  ? · Your lymph nodes do not get smaller or do not return to normal.   ? · You do not get better as expected. Where can you learn more? Go to http://mallorie-corry.info/. Enter P080 in the search box to learn more about \"Swollen Lymph Nodes: Care Instructions. \"  Current as of: March 3, 2017  Content Version: 11.4  © 0839-0328 MoneyLion. Care instructions adapted under license by [employer] (which disclaims liability or warranty for this information). If you have questions about a medical condition or this instruction, always ask your healthcare professional. [place] [address] any warranty or liability for your use of this information.

## 2018-03-09 NOTE — MR AVS SNAPSHOT
[facility] [address] [facility] [address] 
[phone] Patient: Vanessa Nicholas MRN: [id number] IJH:[birth year] Visit Information Date & Time Provider Department Dept. Phone Encounter #  
 3/9/2018 10:00 AM Charles Mohs, [address] Office [phone] [id number] Follow-up Instructions Return in about 2 weeks (around 3/23/2018). Your Appointments 3/27/2018 11:45 AM  
Office Visit with Charles Mohs, MD [address] [place] [address] [facility] Appt Note: 2 weeks lab results [place] [address] [address] [facility] [address]  
[phone]  
  
   
 [facility] [address] [place] [address] Upcoming Health Maintenance Date Due Hepatitis C Screening 1950 DTaP/Tdap/Td series (1 - Tdap) 11/23/1969 BREAST CANCER SCRN MAMMOGRAM 11/23/1998 FOBT Q 1 YEAR AGE 50-75 11/23/1998 ZOSTER VACCINE AGE 60> 9/23/2008 GLAUCOMA SCREENING Q2Y 11/23/2013 Bone Densitometry (Dexa) Screening 11/23/2013 Pneumococcal 65+ High/Highest Risk (1 of 2 - PCV13) 11/23/2013 MEDICARE YEARLY EXAM 11/23/2013 Influenza Age 5 to Adult 8/1/2017 Allergies as of 3/9/2018  Review Complete On: 3/9/2018 By: Charles Mohs, MD  
  
 Severity Noted Reaction Type Reactions Demerol [Meperidine] Medium 08/18/2011    Nausea and Vomiting Low to awake from sleeping Fentanyl Medium 08/18/2011    Nausea and Vomiting Low to awake from sleeping Current Immunizations  Never Reviewed No immunizations on file. Not reviewed this visit You Were Diagnosed With   
  
 Codes Comments Lymphadenopathy, axillary    -  Primary ICD-10-CM: R59.0 ICD-9-CM: 916. 6 Chronic leukopenia     ICD-10-CM: D72.819 ICD-9-CM: 288.50 Portal vein thrombosis     ICD-10-CM: O51 
ICD-9-CM: 936  [facility]     ICD-10-CM: C01.35 
ICD-9-CM: 289.81   
 Acquired thrombophilia ([facility]     ICD-10-CM: O59.60 ICD-9-CM: 289.81 Gastric varices     ICD-10-CM: C37.7 ICD-9-CM: 456.8 Vitals BP Pulse Temp Weight(growth percentile) BMI Smoking Status 126/63 (!) 57 97.8 °F (36.6 °C) (Oral) 146 lb (66.2 kg) 26.7 kg/m2 Never Smoker Vitals History BMI and BSA Data Body Mass Index Body Surface Area  
 26.7 kg/m 2 1.7 m 2 Your Updated Medication List  
  
   
This list is accurate as of 3/9/18 10:16 AM.  Always use your most recent med list.  
  
  
  
  
 aspirin 81 mg chewable tablet Take 81 mg by mouth daily. COLACE 100 mg capsule Generic drug:  docusate sodium Take 100 mg by mouth two (2) times a day. cyanocobalamin 1,000 mcg/mL injection Commonly known as:  VITAMIN B12  
1,000 mcg by IntraMUSCular route once. FISH OIL PO Take  by mouth. Flaxseed Oil Oil  
by Does Not Apply route. LIPITOR 20 mg tablet Generic drug:  atorvastatin Take  by mouth daily. SINGULAIR 10 mg tablet Generic drug:  montelukast  
Take 10 mg by mouth daily. TOPROL XL 25 mg XL tablet Generic drug:  metoprolol succinate Take  by mouth daily. VITAMIN D3 2,000 unit Tab Generic drug:  cholecalciferol (vitamin D3) Take  by mouth. We Performed the Following COMPLETE CBC & AUTO DIFF WBC [14820 CPT(R)] Follow-up Instructions Return in about 2 weeks (around 3/23/2018). To-Do List   
 03/09/2018 Lab:  CBC WITH 3 PART DIFF   
  
 03/09/2018 Lab:  METABOLIC PANEL, COMPREHENSIVE   
  
 03/10/2018 Lab:  PROTEIN C ACTIVITY   
  
 03/10/2018 Lab:  PROTEIN C, TOTAL   
  
 03/10/2018 Lab:  PROTEIN S ANTIGEN Patient Instructions Swollen Lymph Nodes: Care Instructions Your Care Instructions Lymph nodes are small, bean-shaped glands throughout the body. They help your body fight germs and infections. Lymph nodes often swell when there is a problem such as an injury, infection, or tumor. · The nodes in your neck, under your chin, or behind your ears may swell when you have a cold or sore throat. · An injury or infection in a leg or foot can make the nodes in your groin swell. · Sometimes medicine can make lymph nodes swell, but this is rare. Treatment depends on what caused your nodes to swell. Usually the nodes return to normal size without a problem. Follow-up care is a key part of your treatment and safety. Be sure to make and go to all appointments, and call your doctor if you are having problems. It's also a good idea to know your test results and keep a list of the medicines you take. How can you care for yourself at home? · Take your medicines exactly as prescribed. Call your doctor if you think you are having a problem with your medicine. · Avoid irritation. ¨ Do not squeeze or pick at the lump. ¨ Do not stick a needle in it. · Prevent infection. Do not squeeze, drain, or puncture a painful lump. Doing this can irritate or inflame the lump, push any existing infection deeper into the skin, or cause severe bleeding. · Get extra rest. Slow down just a little from your usual routine. · Drink plenty of fluids, enough so that your urine is light yellow or clear like water. If you have kidney, heart, or liver disease and have to limit fluids, talk with your doctor before you increase the amount of fluids you drink. · Take an over-the-counter pain medicine, such as acetaminophen (Tylenol), ibuprofen (Advil, Motrin), or naproxen (Aleve). Read and follow all instructions on the label. · Do not take two or more pain medicines at the same time unless the doctor told you to. Many pain medicines have acetaminophen, which is Tylenol. Too much acetaminophen (Tylenol) can be harmful. When should you call for help? Call your doctor now or seek immediate medical care if: ? · You have worse symptoms of infection, such as: 
¨ Increased pain, swelling, warmth, or redness. ¨ Red streaks leading from the area. ¨ Pus draining from the area. ¨ A fever. ? Watch closely for changes in your health, and be sure to contact your doctor if: 
? · Your lymph nodes do not get smaller or do not return to normal.  
? · You do not get better as expected. Where can you learn more? Go to http://mallorie-corry.info/. Enter R122 in the search box to learn more about \"Swollen Lymph Nodes: Care Instructions. \" Current as of: March 3, 2017 Content Version: 11.4 © 4454-2909 Pixafy. Care instructions adapted under license by [employer] (which disclaims liability or warranty for this information). If you have questions about a medical condition or this instruction, always ask your healthcare professional. [address] any warranty or liability for your use of this information. Introducing [facility]! Dear Bess Redd: Thank you for requesting a [employer] account. Our records indicate that you already have an active [employer] account. You can access your account anytime at [email or web address] Did you know that you can access your hospital and ER discharge instructions at any time in [employer]? You can also review all of your test results from your hospital stay or ER visit. Additional Information If you have questions, please visit the Frequently Asked Questions section of the [employer] website at [email or web address] Remember, [employer] is NOT to be used for urgent needs. For medical emergencies, dial 911. Now available from your iPhone and Android! Please provide this summary of care documentation to your next provider. Your primary care clinician is listed as Ricky Light. If you have any questions after today's visit, please call [phone].

## 2018-03-09 NOTE — PROGRESS NOTES
Hematology/Oncology  Progress Note    Name: Yani Falk  Date: 3/9/2018  : 1950    PCP: Yolette Hudson M.D.     Ms. Ean Oleary is a 74year old female who was seen for management of her acquired thrombophilic disorder. The patient also has portal hypertension with esophageal varices, coronary artery disease, and borderline diabetes mellitus. She also has a documented protein C deficiency  Current therapy: None    Subjective:     Mrs. Ean Oleary is a 39-year-old [facility] American woman who has a thrombophilia disorder characterized by decreased and her protein C antigen and functional protein C levels. She also has a history of portal vein thrombosis. She has been doing well since she was last seen in clinic. Currently, she denies having abdominal pain, nausea, emesis, or diarrhea. She has multiple medical problems and is concerned about her cholesterol level, thyroid function,, and blood sugar level. She has previously had a myocardial infarction and is very interested in her lipid profile. The patient  also has a history of TIA as well. 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 Today she is reporting no new complaints except for the new finding of an enlarged lymph node in the right axilla. The patient reports that in January of this year she had a normal mammogram.  She has no other lymphadenopathy and denies having any unexplained fevers or night sweats. Additionally she denies having any unexplained weight loss. .  She reports that her energy level has been good and physically she has no complaints to report. No past medical history on file. Past Surgical History:   Procedure Laterality Date    HX ACL RECONSTRUCTION       Social History     Social History    Marital status:      Spouse name: N/A    Number of children: N/A    Years of education: N/A     Occupational History    Not on file.      Social History Main Topics    Smoking status: Never Smoker    Smokeless tobacco: Never Used    Alcohol use Yes      Comment: ocassionally    Drug use: Not on file    Sexual activity: Not on file     Other Topics Concern    Not on file     Social History Narrative    No narrative on file     No family history on file. Current Outpatient Prescriptions   Medication Sig Dispense Refill    atorvastatin (LIPITOR) 20 mg tablet Take  by mouth daily.  DOCOSAHEXANOIC ACID/EPA (FISH OIL PO) Take  by mouth.  Flaxseed Oil Oil by Does Not Apply route.  cyanocobalamin (VITAMIN B12) 1,000 mcg/mL injection 1,000 mcg by IntraMUSCular route once.  cholecalciferol, vitamin D3, (VITAMIN D3) 2,000 unit Tab Take  by mouth.  aspirin 81 mg chewable tablet Take 81 mg by mouth daily.  montelukast (SINGULAIR) 10 mg tablet Take 10 mg by mouth daily.  metoprolol-XL (TOPROL XL) 25 mg XL tablet Take  by mouth daily.  docusate sodium (COLACE) 100 mg capsule Take 100 mg by mouth two (2) times a day. Review of Systems  Constitutional: The patient has no acute distress or discomfort. HEENT: The patient denies recent head trauma, eye pain, blurred vision,  hearing deficit, oropharyngeal mucosal pain or lesions, and the patient denies throat pain or discomfort. Lymphatics: The patient denies palpable peripheral lymphadenopathy. Hematologic: The patient denies having bruising, bleeding, or progressive fatigue. Respiratory: Patient denies having shortness of breath, cough, sputum production, fever, or dyspnea on exertion. Cardiovascular: The patient denies having leg pain, leg swelling, heart palpitations, chest permit, chest pain, or lightheadedness. The patient denies having dyspnea on exertion. Gastrointestinal: The patient denies having nausea, emesis, or diarrhea. The patient denies having any hematemesis or blood in the stool.   Genitourinary: Patient denies having urinary urgency, frequency, or dysuria. The patient denies having blood in the urine. Psychological: The patient denies having symptoms of nervousness, anxiety, depression, or thoughts of harming himself some of this. Skin: Patient denies having skin rashes, skin, ulcerations, or unexplained itching or pruritus. Musculoskeletal: The patient denies having pain in the joints, tenderness, or bones. Objective:     Visit Vitals    /63    Pulse (!) 57    Temp 97.8 °F (36.6 °C) (Oral)    Wt 66.2 kg (146 lb)    BMI 26.7 kg/m2     ECOG PS=0     Physical Exam:   Gen. Appearance: The patient is in no acute distress. Skin: There is no bruise or rash. HEENT: The exam is unremarkable. Neck: Supple without lymphadenopathy or thyromegaly. Lungs: Clear to auscultation and percussion; there are no wheezes or rhonchi. Heart: Regular rate and rhythm; there are no murmurs, gallops, or rubs. Anterior chest wall and breast: Deferred. Abdomen: Bowel sounds are present and normal.  There is no guarding, tenderness, or hepatosplenomegaly. Extremities: There is no clubbing, cyanosis, or edema. Neurologic: There are no focal neurologic deficits. Lymphatics: There is no palpable peripheral lymphadenopathy, except for a palpable lesion in the right axilla which is concerning for a right axillary lymph node. Lab data: The CBC from from today, 3/9/2018 shows WBC count of 4.7, hemoglobin 13.5 g/dL, hematocrit 41.3%, and the platelet count was 988,295. Assessment:     1. Lymphadenopathy, axillary    2. Chronic leukopenia    3. Portal vein thrombosis    4. Protein C deficiency ([address]    5. Acquired thrombophilia ([address]    6. Gastric varices      The most recent protein C antigen level was 55 %    Plan:   Axillary lymphadenopathy: I have explained to the patient that she does have a palpable nodular area in her right axilla which is concerning for possible lymph node.   At this time I will refer her for CT scans of the neck and chest which will include the axilla for further assessment. If this is documented by CT scan then she will need to be referred for an excisional biopsy to rule out malignancy or lymphoproliferative disorder. I will plan to see her back in clinic in 1-2 weeks to review the imaging studies and to decide whether or not to refer her to a surgeon. Portal vein thrombosis/hypertension with gastric varices: The clinical exam shows no evidence of recurrent pain or discomfort to suggest any evidence of recurrent splanchnic vessel thrombosis. A comprehensive metabolic panel with liver function enzymes will be checked at this time. Thrombophilia/acquired thrombophilia/protein C deficiency: The studies  indicate that she has an acquired protein C deficiency based on previous lab data over the past couple of years. I have explained to the patient that if she develops  Any type of recurrent thromboembolic events, she will need to remain on anticoagulation indefinitely from that point on. The most recent CT scan of her abdomen did not show any evidence of blood clots. The most recent lab tests, from 7/2017, show a protein C activity level of 60%. The protein C antigen was 55%. I will recheck her functional protein C, protein C antigen, and protein C activity levels at this time. Gastric/esophageal varices(stable problem): The patient previously had an evaluation by the gastroenterologist and the variceal findings were without evidence of bleeding. Chronic leukopenia : I have explained to the patient that her previously recognized leukopenia has resolved with the current WBC count in the normal range at 4.7, with an absolute neutrophil count of 2.8, and absolute lymphocyte count of 1.4.. The flow cytometry from January 26, 2016 revealed no evidence of immunophenotypic abnormalities and the leukocyte populations. We will continue to monitor the leukocyte counts at six-month intervals in the future.              The patient will return to the clinic in 2 weeks to review the results of the CT scans.   Orders Placed This Encounter    COMPLETE CBC & AUTO DIFF WBC    InHouse CBC ([place])     Standing Status:   Future     Number of Occurrences:   1     Standing Expiration Date:   3/16/2018       Fran Dahl MD  3/9/2018

## 2018-03-12 ENCOUNTER — TELEPHONE (OUTPATIENT)
Dept: ONCOLOGY | Age: 68
End: 2018-03-12

## 2018-03-12 NOTE — TELEPHONE ENCOUNTER
Unknown Kehr,  Please call Dorothy Graf at [facility] labs regarding this patient, she is missing some samples for some tests. She said please call her at [phone].

## 2018-03-13 ENCOUNTER — HOSPITAL ENCOUNTER (OUTPATIENT)
Dept: LAB | Age: 68
Discharge: HOME | End: 2018-03-13
Payer: MEDICARE

## 2018-03-13 ENCOUNTER — OFFICE VISIT (OUTPATIENT)
Dept: ONCOLOGY | Age: 68
End: 2018-03-13

## 2018-03-13 DIAGNOSIS — D68.59 PROTEIN C DEFICIENCY (HCC): ICD-10-CM

## 2018-03-13 DIAGNOSIS — D72.819 CHRONIC LEUKOPENIA: ICD-10-CM

## 2018-03-13 DIAGNOSIS — D68.69 ACQUIRED THROMBOPHILIA (HCC): ICD-10-CM

## 2018-03-13 DIAGNOSIS — R59.0 LYMPHADENOPATHY, AXILLARY: ICD-10-CM

## 2018-03-13 DIAGNOSIS — I81 PORTAL VEIN THROMBOSIS: Primary | ICD-10-CM

## 2018-03-13 PROCEDURE — 36415 COLL VENOUS BLD VENIPUNCTURE: CPT | Performed by: INTERNAL MEDICINE

## 2018-03-13 PROCEDURE — 85303 CLOT INHIBIT PROT C ACTIVITY: CPT | Performed by: INTERNAL MEDICINE

## 2018-03-13 PROCEDURE — 85302 CLOT INHIBIT PROT C ANTIGEN: CPT | Performed by: INTERNAL MEDICINE

## 2018-03-13 PROCEDURE — 85305 CLOT INHIBIT PROT S TOTAL: CPT | Performed by: INTERNAL MEDICINE

## 2018-03-13 NOTE — MR AVS SNAPSHOT
[address] [facility] [address] [facility] [address] 
[phone] Patient: Lydia Garza MRN: [id number] ZUX:[id number] Visit Information Date & Time Provider Department Dept. Phone Encounter #  
 3/13/2018  2:15 PM Dionisio Brito 71 Office [phone] [id number] Follow-up Instructions Return in about 6 months (around 9/13/2018). Your Appointments 3/13/2018  2:15 PM  
Office Visit with MD Autumn Brito  ([address]) Appt Note: PER TONY  
 [facility] [address] [facility] [address]  
[phone]  
  
   
 [facility] [address] 9/14/2018  9:30 AM  
[facility] [address] [place] [address]) Appt Note: OV  
 [facility] [address] [facility] [address]  
[phone] Upcoming Health Maintenance Date Due Hepatitis C Screening 1950 DTaP/Tdap/Td series (1 - Tdap) 11/23/1969 BREAST CANCER SCRN MAMMOGRAM 11/23/1998 FOBT Q 1 YEAR AGE 50-75 11/23/1998 ZOSTER VACCINE AGE 60> 9/23/2008 GLAUCOMA SCREENING Q2Y 11/23/2013 Bone Densitometry (Dexa) Screening 11/23/2013 Pneumococcal 65+ High/Highest Risk (1 of 2 - PCV13) 11/23/2013 MEDICARE YEARLY EXAM 11/23/2013 Influenza Age 5 to Adult 8/1/2017 Allergies as of 3/13/2018  Review Complete On: 3/9/2018 By: Kari Diaz MD  
  
 Severity Noted Reaction Type Reactions Demerol [Meperidine] Medium 08/18/2011    Nausea and Vomiting Low to awake from sleeping Fentanyl Medium 08/18/2011    Nausea and Vomiting Low to awake from sleeping Current Immunizations  Never Reviewed No immunizations on file. Not reviewed this visit You Were Diagnosed With   
  
 Codes Comments Portal vein thrombosis    -  Primary ICD-10-CM: J13 
ICD-9-CM: 596 Chronic leukopenia     ICD-10-CM: D72.819 ICD-9-CM: 288.50 Protein C deficiency ([facility]     ICD-10-CM: F74.42 
ICD-9-CM: 289.81 Acquired thrombophilia ([facility]     ICD-10-CM: J74.82 ICD-9-CM: 289.81 Lymphadenopathy, axillary     ICD-10-CM: R59.0 ICD-9-CM: 077. 6 Vitals Smoking Status Never Smoker Your Updated Medication List  
  
   
This list is accurate as of 3/13/18  1:57 PM.  Always use your most recent med list.  
  
  
  
  
 aspirin 81 mg chewable tablet Take 81 mg by mouth daily. COLACE 100 mg capsule Generic drug:  docusate sodium Take 100 mg by mouth two (2) times a day. cyanocobalamin 1,000 mcg/mL injection Commonly known as:  VITAMIN B12  
1,000 mcg by IntraMUSCular route once. FISH OIL PO Take  by mouth. Flaxseed Oil Oil  
by Does Not Apply route. LIPITOR 20 mg tablet Generic drug:  atorvastatin Take  by mouth daily. SINGULAIR 10 mg tablet Generic drug:  montelukast  
Take 10 mg by mouth daily. TOPROL XL 25 mg XL tablet Generic drug:  metoprolol succinate Take  by mouth daily. VITAMIN D3 2,000 unit Tab Generic drug:  cholecalciferol (vitamin D3) Take  by mouth. Follow-up Instructions Return in about 6 months (around 9/13/2018). Patient Instructions Swollen Lymph Nodes: Care Instructions Your Care Instructions Lymph nodes are small, bean-shaped glands throughout the body. They help your body fight germs and infections. Lymph nodes often swell when there is a problem such as an injury, infection, or tumor. · The nodes in your neck, under your chin, or behind your ears may swell when you have a cold or sore throat. · An injury or infection in a leg or foot can make the nodes in your groin swell. · Sometimes medicine can make lymph nodes swell, but this is rare. Treatment depends on what caused your nodes to swell.  Usually the nodes return to normal size without a problem. Follow-up care is a key part of your treatment and safety. Be sure to make and go to all appointments, and call your doctor if you are having problems. It's also a good idea to know your test results and keep a list of the medicines you take. How can you care for yourself at home? · Take your medicines exactly as prescribed. Call your doctor if you think you are having a problem with your medicine. · Avoid irritation. ¨ Do not squeeze or pick at the lump. ¨ Do not stick a needle in it. · Prevent infection. Do not squeeze, drain, or puncture a painful lump. Doing this can irritate or inflame the lump, push any existing infection deeper into the skin, or cause severe bleeding. · Get extra rest. Slow down just a little from your usual routine. · Drink plenty of fluids, enough so that your urine is light yellow or clear like water. If you have kidney, heart, or liver disease and have to limit fluids, talk with your doctor before you increase the amount of fluids you drink. · Take an over-the-counter pain medicine, such as acetaminophen (Tylenol), ibuprofen (Advil, Motrin), or naproxen (Aleve). Read and follow all instructions on the label. · Do not take two or more pain medicines at the same time unless the doctor told you to. Many pain medicines have acetaminophen, which is Tylenol. Too much acetaminophen (Tylenol) can be harmful. When should you call for help? Call your doctor now or seek immediate medical care if: 
? · You have worse symptoms of infection, such as: 
¨ Increased pain, swelling, warmth, or redness. ¨ Red streaks leading from the area. ¨ Pus draining from the area. ¨ A fever. ? Watch closely for changes in your health, and be sure to contact your doctor if: 
? · Your lymph nodes do not get smaller or do not return to normal.  
? · You do not get better as expected. Where can you learn more? Go to http://mallorie-corry.info/. Enter W632 in the search box to learn more about \"Swollen Lymph Nodes: Care Instructions. \" Current as of: March 3, 2017 Content Version: 11.4 © 3510-6349 [employer]. Care instructions adapted under license by [employer] (which disclaims liability or warranty for this information). If you have questions about a medical condition or this instruction, always ask your healthcare professional. [address] any warranty or liability for your use of this information. Introducing [facility]! Dear Sarah Lemons: Thank you for requesting a 1d[employer] account. Our records indicate that you already have an active 1d[employer] account. You can access your account anytime at [email or web address] Did you know that you can access your hospital and ER discharge instructions at any time in 1d4 Pty? You can also review all of your test results from your hospital stay or ER visit. Additional Information If you have questions, please visit the Frequently Asked Questions section of the 1d[employer]ty website at [email or web address] Remember, 1[employer]ty is NOT to be used for urgent needs. For medical emergencies, dial 911. Now available from your iPhone and Android! Please provide this summary of care documentation to your next provider. Your primary care clinician is listed as Jacquie Villela. If you have any questions after today's visit, please call [phone].

## 2018-03-13 NOTE — PATIENT INSTRUCTIONS
Swollen Lymph Nodes: Care Instructions  Your Care Instructions    Lymph nodes are small, bean-shaped glands throughout the body. They help your body fight germs and infections. Lymph nodes often swell when there is a problem such as an injury, infection, or tumor. · The nodes in your neck, under your chin, or behind your ears may swell when you have a cold or sore throat. · An injury or infection in a leg or foot can make the nodes in your groin swell. · Sometimes medicine can make lymph nodes swell, but this is rare. Treatment depends on what caused your nodes to swell. Usually the nodes return to normal size without a problem. Follow-up care is a key part of your treatment and safety. Be sure to make and go to all appointments, and call your doctor if you are having problems. It's also a good idea to know your test results and keep a list of the medicines you take. How can you care for yourself at home? · Take your medicines exactly as prescribed. Call your doctor if you think you are having a problem with your medicine. · Avoid irritation. ¨ Do not squeeze or pick at the lump. ¨ Do not stick a needle in it. · Prevent infection. Do not squeeze, drain, or puncture a painful lump. Doing this can irritate or inflame the lump, push any existing infection deeper into the skin, or cause severe bleeding. · Get extra rest. Slow down just a little from your usual routine. · Drink plenty of fluids, enough so that your urine is light yellow or clear like water. If you have kidney, heart, or liver disease and have to limit fluids, talk with your doctor before you increase the amount of fluids you drink. · Take an over-the-counter pain medicine, such as acetaminophen (Tylenol), ibuprofen (Advil, Motrin), or naproxen (Aleve). Read and follow all instructions on the label. · Do not take two or more pain medicines at the same time unless the doctor told you to.  Many pain medicines have acetaminophen, which is Tylenol. Too much acetaminophen (Tylenol) can be harmful. When should you call for help? Call your doctor now or seek immediate medical care if:  ? · You have worse symptoms of infection, such as:  ¨ Increased pain, swelling, warmth, or redness. ¨ Red streaks leading from the area. ¨ Pus draining from the area. ¨ A fever. ? Watch closely for changes in your health, and be sure to contact your doctor if:  ? · Your lymph nodes do not get smaller or do not return to normal.   ? · You do not get better as expected. Where can you learn more? Go to http://mallorie-corry.info/. Enter A698 in the search box to learn more about \"Swollen Lymph Nodes: Care Instructions. \"  Current as of: March 3, 2017  Content Version: 11.4  © 2054-8900 [employer]. Care instructions adapted under license by [employer] (which disclaims liability or warranty for this information). If you have questions about a medical condition or this instruction, always ask your healthcare professional. [place] [address] any warranty or liability for your use of this information.

## 2018-03-13 NOTE — PROGRESS NOTES
Hematology/medical oncology progress note    3/13/2018  Vanessa Nicholas  YOB: 1950    PCP: Dr. Dakota Prieto    Diagnosis: Protein C deficiency and possible axillary lymphadenopathy    At the recent clinic follow-up Dr. Randi Vivas was felt to have a prominence in the right axilla concerning for axillary lymphadenopathy. I have explained to the patient and I have personally reviewed the imaging studies from the recent CT scans through the neck, chest, and upper abdomen. CT scans to the neck, chest, and upper abdomen was obtained to rule out any evidence of significant pathologic lymphadenopathy. The CT scans revealed no acute findings. There was no mediastinal hilar or axillary lymphadenopathy. The CT scan through the neck showed no pathologic cervical lymphadenopathy. Therefore I have explained to the patient that no additional tests are recommended it is possible that what we were feeling was a lipomatous deposit. She will now resume her regular 6 month follow-up schedule. She was asked to return immediately if new concerns arise so that additional testing can be scheduled. The patient had her questions answered to her satisfaction. Total time 25 minutes, greater than 50% of the time was in counseling and coordination of care. Gonzales Scott MD, [address]

## 2018-03-14 LAB
PROT C PPP-ACNC: 75 % (ref 73–180)
PROT S ACT/NOR PPP: 188 % (ref 57–157)
PROT S PPP-ACNC: 76 % (ref 60–150)

## 2018-03-15 LAB — PROT C AG PPP IA-ACNC: 61 % (ref 60–150)

## 2018-10-09 ENCOUNTER — HOSPITAL ENCOUNTER (OUTPATIENT)
Dept: ONCOLOGY | Age: 68
Discharge: HOME | End: 2018-10-09

## 2018-10-09 ENCOUNTER — OFFICE VISIT (OUTPATIENT)
Dept: ONCOLOGY | Age: 68
End: 2018-10-09

## 2018-10-09 ENCOUNTER — HOSPITAL ENCOUNTER (OUTPATIENT)
Dept: LAB | Age: 68
Discharge: HOME | End: 2018-10-09
Payer: MEDICARE

## 2018-10-09 VITALS
TEMPERATURE: 97.9 F | WEIGHT: 147 LBS | RESPIRATION RATE: 16 BRPM | HEIGHT: 63 IN | OXYGEN SATURATION: 100 % | BODY MASS INDEX: 26.05 KG/M2

## 2018-10-09 DIAGNOSIS — D72.819 CHRONIC LEUKOPENIA: ICD-10-CM

## 2018-10-09 DIAGNOSIS — D68.69 ACQUIRED THROMBOPHILIA (HCC): ICD-10-CM

## 2018-10-09 DIAGNOSIS — D68.59 PROTEIN C DEFICIENCY (HCC): ICD-10-CM

## 2018-10-09 DIAGNOSIS — I81 PORTAL VEIN THROMBOSIS: ICD-10-CM

## 2018-10-09 DIAGNOSIS — I85.00 PORTAL HYPERTENSION WITH ESOPHAGEAL VARICES (HCC): ICD-10-CM

## 2018-10-09 DIAGNOSIS — K76.6 PORTAL HYPERTENSION WITH ESOPHAGEAL VARICES (HCC): ICD-10-CM

## 2018-10-09 DIAGNOSIS — D72.819 CHRONIC LEUKOPENIA: Primary | ICD-10-CM

## 2018-10-09 LAB
BASO+EOS+MONOS # BLD AUTO: 0.5 K/UL (ref 0–2.3)
BASO+EOS+MONOS # BLD AUTO: 10 % (ref 0.1–17)
DIFFERENTIAL METHOD BLD: NORMAL
ERYTHROCYTE [DISTWIDTH] IN BLOOD BY AUTOMATED COUNT: 13.3 % (ref 11.5–14.5)
HCT VFR BLD AUTO: 42.6 % (ref 36–48)
HGB BLD-MCNC: 13.6 G/DL (ref 12–16)
LYMPHOCYTES # BLD: 1.3 K/UL (ref 1.1–5.9)
LYMPHOCYTES NFR BLD: 27 % (ref 14–44)
MCH RBC QN AUTO: 29.1 PG (ref 25–35)
MCHC RBC AUTO-ENTMCNC: 31.9 G/DL (ref 31–37)
MCV RBC AUTO: 91.2 FL (ref 78–102)
NEUTS SEG # BLD: 3.1 K/UL (ref 1.8–9.5)
NEUTS SEG NFR BLD: 64 % (ref 40–70)
PLATELET # BLD AUTO: 258 K/UL (ref 140–440)
RBC # BLD AUTO: 4.67 M/UL (ref 4.1–5.1)
WBC # BLD AUTO: 4.9 K/UL (ref 4.5–13)

## 2018-10-09 PROCEDURE — 85303 CLOT INHIBIT PROT C ACTIVITY: CPT | Performed by: INTERNAL MEDICINE

## 2018-10-09 PROCEDURE — 36415 COLL VENOUS BLD VENIPUNCTURE: CPT | Performed by: INTERNAL MEDICINE

## 2018-10-09 PROCEDURE — 85302 CLOT INHIBIT PROT C ANTIGEN: CPT | Performed by: INTERNAL MEDICINE

## 2018-10-09 PROCEDURE — 80053 COMPREHEN METABOLIC PANEL: CPT | Performed by: INTERNAL MEDICINE

## 2018-10-09 RX ORDER — ACYCLOVIR 50 MG/G
CREAM TOPICAL
Refills: 1 | COMMUNITY
Start: 2018-09-10

## 2018-10-09 RX ORDER — ALBUTEROL SULFATE 90 UG/1
AEROSOL, METERED RESPIRATORY (INHALATION)
COMMUNITY
Start: 2018-07-03

## 2018-10-09 RX ORDER — CHOLECALCIFEROL (VITAMIN D3) 125 MCG
CAPSULE ORAL
COMMUNITY

## 2018-10-09 RX ORDER — ASCORBIC ACID 500 MG
TABLET ORAL
COMMUNITY

## 2018-10-09 RX ORDER — ROSUVASTATIN CALCIUM 10 MG/1
20 TABLET, COATED ORAL
COMMUNITY

## 2018-10-09 RX ORDER — FAMOTIDINE 20 MG/1
20 TABLET, FILM COATED ORAL 2 TIMES DAILY
COMMUNITY
End: 2021-11-01

## 2018-10-09 RX ORDER — NADOLOL 20 MG/1
20 TABLET ORAL
COMMUNITY

## 2018-10-09 NOTE — MR AVS SNAPSHOT
Virgen Sauceda 
 
 
 [facility] [address] [facility] [id number] [phone] Patient: Eric Morales MRN: [id number] WPF:[id number] Visit Information Date & Time Provider Department Dept. Phone Encounter #  
 10/9/2018  9:30 AM Michelle Tucker MD [address] [facility]  [phone] [id number] Follow-up Instructions Return in about 6 months (around 4/9/2019). Your Appointments 4/9/2019  9:30 AM  
Office Visit with MD Ximena Galindo [facility]  [address] [facility]) Appt Note: OV  
 [facility] [address] [facility] [address]  
[phone]  
  
   
 [facility] [address] Upcoming Health Maintenance Date Due Hepatitis C Screening 1950 DTaP/Tdap/Td series (1 - Tdap) 11/23/1969 Shingrix Vaccine Age 50> (1 of 2) 11/23/1998 BREAST CANCER SCRN MAMMOGRAM 11/23/1998 FOBT Q 1 YEAR AGE 50-75 11/23/1998 GLAUCOMA SCREENING Q2Y 11/23/2013 Bone Densitometry (Dexa) Screening 11/23/2013 Pneumococcal 65+ High/Highest Risk (1 of 2 - PCV13) 11/23/2013 MEDICARE YEARLY EXAM 3/14/2018 Influenza Age 5 to Adult 8/1/2018 Allergies as of 10/9/2018  Review Complete On: 10/9/2018 By: Michelle Tucker MD  
  
 Severity Noted Reaction Type Reactions Demerol [Meperidine] Medium 08/18/2011    Nausea and Vomiting Low to awake from sleeping Fentanyl Medium 08/18/2011    Nausea and Vomiting Low to awake from sleeping Current Immunizations  Never Reviewed No immunizations on file. Not reviewed this visit You Were Diagnosed With   
  
 Codes Comments Chronic leukopenia    -  Primary ICD-10-CM: Q85.057 ICD-9-CM: 288.50 Portal vein thrombosis     ICD-10-CM: U07 
ICD-9-CM: 096  Protein C deficiency ([facility] [address]     ICD-10-CM: O23.26 
ICD-9-CM: 289.81   
 Portal hypertension with esophageal varices (HCC)     ICD-10-CM: K76.6, I85.00 ICD-9-CM: 572.3, 456.21 Acquired thrombophilia ([facility] [address]     ICD-10-CM: Z22.07 ICD-9-CM: 289.81 Vitals Temp Resp Height(growth percentile) Weight(growth percentile) SpO2 BMI  
 97.9 °F (36.6 °C) (Oral) 16 5' 2.5\" (1.588 m) 147 lb (66.7 kg) 100% 26.46 kg/m2 Smoking Status Never Smoker BMI and BSA Data Body Mass Index Body Surface Area  
 26.46 kg/m 2 1.72 m 2 Your Updated Medication List  
  
   
This list is accurate as of 10/9/18 10:20 AM.  Always use your most recent med list.  
  
  
  
  
 aspirin 81 mg chewable tablet Take 81 mg by mouth daily. biotin 5,000 mcg Tbdi Take  by mouth. COLACE 100 mg capsule Generic drug:  docusate sodium Take 100 mg by mouth two (2) times a day. CORGARD 20 mg tablet Generic drug:  nadolol Take  by mouth daily. CRESTOR 10 mg tablet Generic drug:  rosuvastatin Take 10 mg by mouth nightly. cyanocobalamin 1,000 mcg/mL injection Commonly known as:  VITAMIN B12  
1,000 mcg by IntraMUSCular route once. FISH OIL PO Take  by mouth. Flaxseed Oil Oil  
by Does Not Apply route. LIPITOR 20 mg tablet Generic drug:  atorvastatin Take  by mouth daily. PEPCID 20 mg tablet Generic drug:  famotidine Take 20 mg by mouth two (2) times a day. SINGULAIR 10 mg tablet Generic drug:  montelukast  
Take 10 mg by mouth daily. TOPROL XL 25 mg XL tablet Generic drug:  metoprolol succinate Take  by mouth daily. VENTOLIN HFA 90 mcg/actuation inhaler Generic drug:  albuterol VITAMIN C 500 mg tablet Generic drug:  ascorbic acid (vitamin C) Take  by mouth. VITAMIN D3 2,000 unit Tab Generic drug:  cholecalciferol (vitamin D3) Take  by mouth. ZOVIRAX 5 % topical cream  
Generic drug:  acyclovir AMANDA EXT AA Q 4 H PRN  
  
  
  
  
 We Performed the Following COMPLETE CBC & AUTO DIFF WBC [09527 CPT(R)] Follow-up Instructions Return in about 6 months (around 4/9/2019). To-Do List   
 10/09/2018 Lab:  CBC WITH 3 PART DIFF Introducing [address]! Dear Stanislav Saul: Thank you for requesting a [employer] account. Our records indicate that you already have an active [employer] account. You can access your account anytime at [email or web address] Did you know that you can access your hospital and ER discharge instructions at any time in [employer]? You can also review all of your test results from your hospital stay or ER visit. Additional Information If you have questions, please visit the Frequently Asked Questions section of the [employer] website at [email or web address] Remember, [employer] is NOT to be used for urgent needs. For medical emergencies, dial 911. Now available from your iPhone and Android! Please provide this summary of care documentation to your next provider. Your primary care clinician is listed as Scooter Orellana. If you have any questions after today's visit, please call [phone].

## 2018-10-09 NOTE — PROGRESS NOTES
Hematology/Oncology  Progress Note Name: Layla Bass 
Date: 10/9/2018 : 1950 PCP: Arpan Page M.D.  
 
Ms. Peggy Amador is a 69year old female who was seen for management of her acquired thrombophilic disorder. The patient also has portal hypertension with esophageal varices, coronary artery disease, and borderline diabetes mellitus. She also has a documented protein C deficiency Current therapy: None Subjective:  
 
Mrs. Peggy Amador is a 64-year-old [facility] [place] woman who has a thrombophilia disorder characterized by decreased and her protein C antigen and functional protein C levels. She also has a history of portal vein thrombosis. She has been doing well since she was last seen in clinic. Currently, she denies having abdominal pain, nausea, emesis, or diarrhea. She has multiple medical problems and is concerned about her cholesterol level, thyroid function,, and blood sugar level. She has previously had a myocardial infarction and is very interested in her lipid profile. The patient  also has a history of TIA as well. 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 Today she is reporting no new complaints except for the new finding of an enlarged lymph node in the right axilla. The patient reports that in January of this year she had a normal mammogram.  She has no other lymphadenopathy and denies having any unexplained fevers or night sweats. Additionally she denies having any unexplained weight loss. .  She reports that her energy level has been good and physically she has no complaints to report. The patient is maintaining her weight reasonably well. No past medical history on file. Past Surgical History:  
Procedure Laterality Date  HX ACL RECONSTRUCTION Social History Social History  Marital status:    Spouse name: N/A  
 Number of children: N/A  
 Years of education: N/A  
 
 Occupational History  Not on file. Social History Main Topics  Smoking status: Never Smoker  Smokeless tobacco: Never Used  Alcohol use Yes Comment: ocassionally  Drug use: Not on file  Sexual activity: Not on file Other Topics Concern  Not on file Social History Narrative  No narrative on file No family history on file. Current Outpatient Prescriptions Medication Sig Dispense Refill  ZOVIRAX 5 % topical cream AMANDA EXT AA Q 4 H PRN  1  
 VENTOLIN HFA 90 mcg/actuation inhaler  rosuvastatin (CRESTOR) 10 mg tablet Take 10 mg by mouth nightly.  ascorbic acid, vitamin C, (VITAMIN C) 500 mg tablet Take  by mouth.  famotidine (PEPCID) 20 mg tablet Take 20 mg by mouth two (2) times a day.  biotin 5,000 mcg TbDi Take  by mouth.  nadolol (CORGARD) 20 mg tablet Take  by mouth daily.  Flaxseed Oil Oil by Does Not Apply route.  cholecalciferol, vitamin D3, (VITAMIN D3) 2,000 unit Tab Take  by mouth.  aspirin 81 mg chewable tablet Take 81 mg by mouth daily.  montelukast (SINGULAIR) 10 mg tablet Take 10 mg by mouth daily.  atorvastatin (LIPITOR) 20 mg tablet Take  by mouth daily.  DOCOSAHEXANOIC ACID/EPA (FISH OIL PO) Take  by mouth.  cyanocobalamin (VITAMIN B12) 1,000 mcg/mL injection 1,000 mcg by IntraMUSCular route once.  metoprolol-XL (TOPROL XL) 25 mg XL tablet Take  by mouth daily.  docusate sodium (COLACE) 100 mg capsule Take 100 mg by mouth two (2) times a day. Review of Systems Constitutional: The patient has no acute distress or discomfort. HEENT: The patient denies recent head trauma, eye pain, blurred vision,  hearing deficit, oropharyngeal mucosal pain or lesions, and the patient denies throat pain or discomfort. Lymphatics: The patient denies palpable peripheral lymphadenopathy. Hematologic: The patient denies having bruising, bleeding, or progressive fatigue. Respiratory: Patient denies having shortness of breath, cough, sputum production, fever, or dyspnea on exertion. Cardiovascular: The patient denies having leg pain, leg swelling, heart palpitations, chest permit, chest pain, or lightheadedness. The patient denies having dyspnea on exertion. Gastrointestinal: The patient denies having nausea, emesis, or diarrhea. The patient denies having any hematemesis or blood in the stool. Genitourinary: Patient denies having urinary urgency, frequency, or dysuria. The patient denies having blood in the urine. Psychological: The patient denies having symptoms of nervousness, anxiety, depression, or thoughts of harming himself some of this. Skin: Patient denies having skin rashes, skin, ulcerations, or unexplained itching or pruritus. Musculoskeletal: The patient denies having pain in the joints, tenderness, or bones. Objective:  
 
Visit Vitals  Temp 97.9 °F (36.6 °C) (Oral)  Resp 16  
 Ht 5' 2.5\" (1.588 m)  Wt 66.7 kg (147 lb)  SpO2 100%  BMI 26.46 kg/m2 ECOG PS=0 Physical Exam:  
Gen. Appearance: The patient is in no acute distress. Skin: There is no bruise or rash. HEENT: The exam is unremarkable. Neck: Supple without lymphadenopathy or thyromegaly. Lungs: Clear to auscultation and percussion; there are no wheezes or rhonchi. Heart: Regular rate and rhythm; there are no murmurs, gallops, or rubs. Anterior chest wall and breast: Deferred. Abdomen: Bowel sounds are present and normal.  There is no guarding, tenderness, or hepatosplenomegaly. Extremities: There is no clubbing, cyanosis, or edema. Neurologic: There are no focal neurologic deficits. Lymphatics: There is no palpable peripheral lymphadenopathy, except for a palpable lesion in the right axilla which is concerning for a right axillary lymph node. Lab data:  
The CBC from from today, 10/9/2018, shows a WBC count of 4.9, hemoglobin 13.6 g/dL, hematocrit 42.6%, and the platelet count 544,662. Assessment: 1. Chronic leukopenia 2. Portal vein thrombosis 3. Protein C deficiency ([facility] [address] 4. Portal hypertension with esophageal varices (HCC) 5. Acquired thrombophilia ([facility] [address] The most recent protein C antigen level was 75%. Plan:  
Portal vein thrombosis/hypertension with gastric varices: The clinical exam shows no evidence of recurrent pain or discomfort to suggest any evidence of recurrent splanchnic vessel thrombosis. A comprehensive metabolic panel with liver function enzymes will be checked at this time. Thrombophilia/acquired thrombophilia/protein C deficiency: The studies  indicate that she has an acquired protein C deficiency based on previous lab data over the past couple of years. I have explained to the patient that if she develops  Any type of recurrent thromboembolic events, she will need to remain on anticoagulation indefinitely from that point on. The most recent CT scan of her abdomen did not show any evidence of blood clots. The most recent lab tests, from 3/14/2018 shows a functional protein C level of 75% with a reference range of %. The total protein C was 76%. I have explained to the patient and protein C levels have slowly normalized over the past 3 years. I will recheck her functional protein C, protein C antigen, and protein C activity levels at this time. Gastric/esophageal varices(stable problem): The patient previously had an evaluation by the gastroenterologist and the variceal findings were without evidence of bleeding. Chronic leukopenia : I have explained to the patient that her previously recognized leukopenia has resolved with the current WBC count in the normal range at 4.9, with an absolute neutrophil count of 3.1, and absolute lymphocyte count of 1.3.  The flow cytometry from January 26, 2016 revealed no evidence of immunophenotypic abnormalities and the leukocyte populations. We will continue to monitor the leukocyte counts at six-month intervals in the future. The patient will return to the clinic in 2 weeks to review the results of the CT scans. Orders Placed This Encounter  COMPLETE CBC & AUTO DIFF WBC  InHouse CBC ([employer]) Standing Status:   Future Number of Occurrences:   1 Standing Expiration Date:   10/16/2018  METABOLIC PANEL, COMPREHENSIVE Standing Status:   Future Standing Expiration Date:   10/10/2019  
 PROTEIN C, TOTAL Standing Status:   Future Standing Expiration Date:   10/10/2019  
 PROTEIN C ACTIVITY Standing Status:   Future Standing Expiration Date:   10/10/2019  ZOVIRAX 5 % topical cream  
  Sig: AMANDA EXT AA Q 4 H PRN Refill:  1  VENTOLIN HFA 90 mcg/actuation inhaler  rosuvastatin (CRESTOR) 10 mg tablet Sig: Take 10 mg by mouth nightly.  ascorbic acid, vitamin C, (VITAMIN C) 500 mg tablet Sig: Take  by mouth.  famotidine (PEPCID) 20 mg tablet Sig: Take 20 mg by mouth two (2) times a day.  biotin 5,000 mcg TbDi Sig: Take  by mouth.  nadolol (CORGARD) 20 mg tablet Sig: Take  by mouth daily. Stacy Ventura MD 
10/9/2018

## 2018-10-10 LAB
ALBUMIN SERPL-MCNC: 4.4 G/DL (ref 3.4–5)
ALBUMIN/GLOB SERPL: 1.5 {RATIO} (ref 0.8–1.7)
ALP SERPL-CCNC: 90 U/L (ref 45–117)
ALT SERPL-CCNC: 49 U/L (ref 13–56)
ANION GAP SERPL CALC-SCNC: 9 MMOL/L (ref 3–18)
AST SERPL-CCNC: 27 U/L (ref 15–37)
BILIRUB SERPL-MCNC: 1 MG/DL (ref 0.2–1)
BUN SERPL-MCNC: 15 MG/DL (ref 7–18)
BUN/CREAT SERPL: 24 (ref 12–20)
CALCIUM SERPL-MCNC: 10 MG/DL (ref 8.5–10.1)
CHLORIDE SERPL-SCNC: 106 MMOL/L (ref 100–108)
CO2 SERPL-SCNC: 26 MMOL/L (ref 21–32)
CREAT SERPL-MCNC: 0.62 MG/DL (ref 0.6–1.3)
GLOBULIN SER CALC-MCNC: 2.9 G/DL (ref 2–4)
GLUCOSE SERPL-MCNC: 87 MG/DL (ref 74–99)
POTASSIUM SERPL-SCNC: 4.5 MMOL/L (ref 3.5–5.5)
PROT SERPL-MCNC: 7.3 G/DL (ref 6.4–8.2)
SODIUM SERPL-SCNC: 141 MMOL/L (ref 136–145)

## 2018-10-12 LAB
PROT C AG PPP IA-ACNC: 76 % (ref 60–150)
PROT C PPP-ACNC: 78 % (ref 73–180)

## 2019-01-10 ENCOUNTER — TELEPHONE (OUTPATIENT)
Dept: INFUSION THERAPY | Age: 69
End: 2019-01-10

## 2019-01-10 NOTE — TELEPHONE ENCOUNTER
1/10/2019 at 1341 - Patient called the clinic asking if she needs to be seen by Dr. Everardo Trent for her new PVL result. Patient was instructed to make an appointment to see Dr. Everardo Trent to discuss the result and the plan of treatment.

## 2019-04-09 ENCOUNTER — OFFICE VISIT (OUTPATIENT)
Dept: ONCOLOGY | Age: 69
End: 2019-04-09

## 2019-04-09 ENCOUNTER — HOSPITAL ENCOUNTER (OUTPATIENT)
Dept: ONCOLOGY | Age: 69
Discharge: HOME | End: 2019-04-09

## 2019-04-09 ENCOUNTER — HOSPITAL ENCOUNTER (OUTPATIENT)
Dept: LAB | Age: 69
Discharge: HOME | End: 2019-04-09
Payer: MEDICARE

## 2019-04-09 VITALS
SYSTOLIC BLOOD PRESSURE: 136 MMHG | HEIGHT: 63 IN | HEART RATE: 65 BPM | DIASTOLIC BLOOD PRESSURE: 79 MMHG | BODY MASS INDEX: 26.46 KG/M2 | TEMPERATURE: 97.7 F | OXYGEN SATURATION: 98 %

## 2019-04-09 DIAGNOSIS — D68.59 PROTEIN C DEFICIENCY (HCC): Primary | ICD-10-CM

## 2019-04-09 DIAGNOSIS — D68.69 ACQUIRED THROMBOPHILIA (HCC): ICD-10-CM

## 2019-04-09 DIAGNOSIS — D72.819 CHRONIC LEUKOPENIA: ICD-10-CM

## 2019-04-09 DIAGNOSIS — I85.00 PORTAL HYPERTENSION WITH ESOPHAGEAL VARICES (HCC): ICD-10-CM

## 2019-04-09 DIAGNOSIS — D68.59 PROTEIN C DEFICIENCY (HCC): ICD-10-CM

## 2019-04-09 DIAGNOSIS — I81 PORTAL VEIN THROMBOSIS: ICD-10-CM

## 2019-04-09 DIAGNOSIS — I86.4 GASTRIC VARICES: ICD-10-CM

## 2019-04-09 DIAGNOSIS — K76.6 PORTAL HYPERTENSION WITH ESOPHAGEAL VARICES (HCC): ICD-10-CM

## 2019-04-09 LAB
ALBUMIN SERPL-MCNC: 4.5 G/DL (ref 3.4–5)
ALBUMIN/GLOB SERPL: 1.9 {RATIO} (ref 0.8–1.7)
ALP SERPL-CCNC: 85 U/L (ref 45–117)
ALT SERPL-CCNC: 41 U/L (ref 13–56)
ANION GAP SERPL CALC-SCNC: 8 MMOL/L (ref 3–18)
AST SERPL-CCNC: 22 U/L (ref 15–37)
BASO+EOS+MONOS # BLD AUTO: 0.5 K/UL (ref 0–2.3)
BASO+EOS+MONOS NFR BLD AUTO: 8 % (ref 0.1–17)
BILIRUB SERPL-MCNC: 1.3 MG/DL (ref 0.2–1)
BUN SERPL-MCNC: 11 MG/DL (ref 7–18)
BUN/CREAT SERPL: 18 (ref 12–20)
CALCIUM SERPL-MCNC: 9.3 MG/DL (ref 8.5–10.1)
CHLORIDE SERPL-SCNC: 103 MMOL/L (ref 100–108)
CO2 SERPL-SCNC: 27 MMOL/L (ref 21–32)
CREAT SERPL-MCNC: 0.61 MG/DL (ref 0.6–1.3)
DIFFERENTIAL METHOD BLD: NORMAL
ERYTHROCYTE [DISTWIDTH] IN BLOOD BY AUTOMATED COUNT: 13.7 % (ref 11.5–14.5)
GLOBULIN SER CALC-MCNC: 2.4 G/DL (ref 2–4)
GLUCOSE SERPL-MCNC: 90 MG/DL (ref 74–99)
HCT VFR BLD AUTO: 43.4 % (ref 36–48)
HGB BLD-MCNC: 14 G/DL (ref 12–16)
LYMPHOCYTES # BLD: 1.6 K/UL (ref 1.1–5.9)
LYMPHOCYTES NFR BLD: 24 % (ref 14–44)
MCH RBC QN AUTO: 30.2 PG (ref 25–35)
MCHC RBC AUTO-ENTMCNC: 32.3 G/DL (ref 31–37)
MCV RBC AUTO: 93.5 FL (ref 78–102)
NEUTS SEG # BLD: 4.7 K/UL (ref 1.8–9.5)
NEUTS SEG NFR BLD: 69 % (ref 40–70)
PLATELET # BLD AUTO: 299 K/UL (ref 140–440)
POTASSIUM SERPL-SCNC: 3.9 MMOL/L (ref 3.5–5.5)
PROT SERPL-MCNC: 6.9 G/DL (ref 6.4–8.2)
RBC # BLD AUTO: 4.64 M/UL (ref 4.1–5.1)
SODIUM SERPL-SCNC: 138 MMOL/L (ref 136–145)
WBC # BLD AUTO: 6.8 K/UL (ref 4.5–13)

## 2019-04-09 PROCEDURE — 36415 COLL VENOUS BLD VENIPUNCTURE: CPT

## 2019-04-09 PROCEDURE — 80053 COMPREHEN METABOLIC PANEL: CPT

## 2019-04-09 PROCEDURE — 85305 CLOT INHIBIT PROT S TOTAL: CPT

## 2019-04-09 PROCEDURE — 85302 CLOT INHIBIT PROT C ANTIGEN: CPT

## 2019-04-09 PROCEDURE — 85303 CLOT INHIBIT PROT C ACTIVITY: CPT

## 2019-04-09 NOTE — PROGRESS NOTES
Hematology/Oncology  Progress Note Name: Mark Mccarthy 
Date: 2019 : 1950 PCP: Quique Trejo M.D.  
 
Ms. Dior Scherer is a 77year old female who was seen for management of her acquired thrombophilic disorder. The patient also has portal hypertension with esophageal varices, coronary artery disease, and borderline diabetes mellitus. She also has a documented protein C deficiency Current therapy: None Subjective:  
 
Mrs. Dior Scherer is a 78-year-old [facility] [place] woman who has a thrombophilia disorder characterized by decreased and her protein C antigen and functional protein C levels. She also has a history of portal vein thrombosis. She has been doing well since she was last seen in clinic. Currently, she denies having abdominal pain, nausea, emesis, or diarrhea. She has multiple medical problems and is concerned about her cholesterol level, thyroid function,, and blood sugar level. She has previously had a myocardial infarction and is very interested in her lipid profile. The patient  also has a history of TIA as well. 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 Today she is reporting no new complaints. Since her last clinic visit she did have some abdominal pain and in late December she had an ultrasound of the abdomen which showed that she had an echogenic thrombus in the partially obstructed portal vein without cavernous transformation. There was also some evidence of splenic calcifications similar to a prior study consistent with a history of granulomatous disease. Further study of the portal vein system showed that the echogenic thrombus measures 0.9 cm within portal vein and there was evidence of hepato-pedal flow to the level of the thrombus with several collateral vessels noted. The thrombus appeared to have been partially recanalized. There are no additional complaints.   She is doing well otherwise. History reviewed. No pertinent past medical history. Past Surgical History:  
Procedure Laterality Date  HX ACL RECONSTRUCTION Social History Socioeconomic History  Marital status:  Spouse name: Not on file  Number of children: Not on file  Years of education: Not on file  Highest education level: Not on file Occupational History  Not on file Social Needs  Financial resource strain: Not on file  Food insecurity:  
  Worry: Not on file Inability: Not on file  Transportation needs:  
  Medical: Not on file Non-medical: Not on file Tobacco Use  Smoking status: Never Smoker  Smokeless tobacco: Never Used Substance and Sexual Activity  Alcohol use: Yes Comment: ocassionally  Drug use: Not on file  Sexual activity: Not on file Lifestyle  Physical activity:  
  Days per week: Not on file Minutes per session: Not on file  Stress: Not on file Relationships  Social connections:  
  Talks on phone: Not on file Gets together: Not on file Attends [employer] service: Not on file Active member of club or organization: Not on file Attends meetings of clubs or organizations: Not on file Relationship status: Not on file  Intimate partner violence:  
  Fear of current or ex partner: Not on file Emotionally abused: Not on file Physically abused: Not on file Forced sexual activity: Not on file Other Topics Concern  Not on file Social History Narrative  Not on file History reviewed. No pertinent family history. Current Outpatient Medications Medication Sig Dispense Refill  ZOVIRAX 5 % topical cream AMANDA EXT AA Q 4 H PRN  1  
 VENTOLIN HFA 90 mcg/actuation inhaler  rosuvastatin (CRESTOR) 10 mg tablet Take 10 mg by mouth nightly.  ascorbic acid, vitamin C, (VITAMIN C) 500 mg tablet Take  by mouth.  famotidine (PEPCID) 20 mg tablet Take 20 mg by mouth two (2) times a day.  biotin 5,000 mcg TbDi Take  by mouth.  nadolol (CORGARD) 20 mg tablet Take  by mouth daily.  atorvastatin (LIPITOR) 20 mg tablet Take  by mouth daily.  DOCOSAHEXANOIC ACID/EPA (FISH OIL PO) Take  by mouth.  Flaxseed Oil Oil by Does Not Apply route.  cyanocobalamin (VITAMIN B12) 1,000 mcg/mL injection 1,000 mcg by IntraMUSCular route once.  cholecalciferol, vitamin D3, (VITAMIN D3) 2,000 unit Tab Take  by mouth.  aspirin 81 mg chewable tablet Take 81 mg by mouth daily.  montelukast (SINGULAIR) 10 mg tablet Take 10 mg by mouth daily.  metoprolol-XL (TOPROL XL) 25 mg XL tablet Take  by mouth daily.  docusate sodium (COLACE) 100 mg capsule Take 100 mg by mouth two (2) times a day. Review of Systems Constitutional: The patient has no acute distress or discomfort. HEENT: The patient denies recent head trauma, eye pain, blurred vision,  hearing deficit, oropharyngeal mucosal pain or lesions, and the patient denies throat pain or discomfort. Lymphatics: The patient denies palpable peripheral lymphadenopathy. Hematologic: The patient denies having bruising, bleeding, or progressive fatigue. Respiratory: Patient denies having shortness of breath, cough, sputum production, fever, or dyspnea on exertion. Cardiovascular: The patient denies having leg pain, leg swelling, heart palpitations, chest permit, chest pain, or lightheadedness. The patient denies having dyspnea on exertion. Gastrointestinal: The patient denies having nausea, emesis, or diarrhea. The patient denies having any hematemesis or blood in the stool. Genitourinary: Patient denies having urinary urgency, frequency, or dysuria. The patient denies having blood in the urine. Psychological: The patient denies having symptoms of nervousness, anxiety, depression, or thoughts of harming himself some of this. Skin: Patient denies having skin rashes, skin, ulcerations, or unexplained itching or pruritus. Musculoskeletal: The patient denies having pain in the joints, tenderness, or bones. Objective:  
 
Visit Vitals /79 Pulse 65 Temp 97.7 °F (36.5 °C) Ht 5' 2.5\" (1.588 m) SpO2 98% BMI 26.46 kg/m² ECOG PS=0 Physical Exam:  
Gen. Appearance: The patient is in no acute distress. Skin: There is no bruise or rash. HEENT: The exam is unremarkable. Neck: Supple without lymphadenopathy or thyromegaly. Lungs: Clear to auscultation and percussion; there are no wheezes or rhonchi. Heart: Regular rate and rhythm; there are no murmurs, gallops, or rubs. Anterior chest wall and breast: Deferred. Abdomen: Bowel sounds are present and normal.  There is no guarding, tenderness, or hepatosplenomegaly. Extremities: There is no clubbing, cyanosis, or edema. Neurologic: There are no focal neurologic deficits. Lymphatics: There is no palpable peripheral lymphadenopathy, except for a palpable lesion in the right axilla which is concerning for a right axillary lymph node. Musculoskeletal: Patient has full range of motion at all joints. There is no focal joint tenderness. Psychological: The patient is oriented to person place and time. She is focused and coherent. Lab data: The CBC from from today, 10/9/2018, shows a WBC count of 4.9, hemoglobin 13.6 g/dL, hematocrit 42.6%, and the platelet count 370,204. Assessment: 1. Protein C deficiency ([address] 2. Acquired thrombophilia ([address] 3. Portal vein thrombosis 4. Portal hypertension with esophageal varices (HCC) 5. Chronic leukopenia 6. Gastric varices The most recent protein C antigen level was 75%. Plan:  
Portal vein thrombosis/hypertension with gastric varices: The clinical exam shows no evidence of recurrent pain or discomfort to suggest any evidence of recurrent splanchnic vessel thrombosis.  A comprehensive metabolic panel with liver function enzymes will be checked at this time. Thrombophilia/acquired thrombophilia/protein C deficiency: The studies  indicate that she has an acquired protein C deficiency based on previous lab data over the past couple of years. I have explained to the patient that if she develops  Any type of recurrent thromboembolic events, she will need to remain on anticoagulation indefinitely from that point on. The most recent ultrasound through the abdomen showed evidence of a partially recanalized thrombus in the portal vein which was nonocclusive. There was evidence of collateral vessels as well. I have explained to the patient that we would not need to start on anticoagulation at this time based on these findings. She will have a repeat of her studies done at this time. This will include the protein C panel with the functional protein C and protein C antigen. Gastric/esophageal varices(stable problem): The patient previously had an evaluation by the gastroenterologist and the variceal findings were without evidence of bleeding. Chronic leukopenia : I have explained to the patient that her previously recognized leukopenia has resolved with the current WBC count in the normal range at 6.8 with an absolute neutrophil count of 4.7, and an absolute lymphocyte count of 1.6. The flow cytometry from January 26, 2016 revealed no evidence of immunophenotypic abnormalities and the leukocyte populations. We will continue to monitor the leukocyte counts at six-month intervals in the future. The patient will return to the clinic in 6 months. Orders Placed This Encounter  COMPLETE CBC & AUTO DIFF WBC  PROTEIN C ACTIVITY Standing Status:   Future Standing Expiration Date:   4/9/2020  
 PROTEIN C, TOTAL Standing Status:   Future Standing Expiration Date:   4/9/2020  
 PROTEIN S ANTIGEN Standing Status:   Future Standing Expiration Date:   4/9/2020  METABOLIC PANEL, COMPREHENSIVE Standing Status:   Future Standing Expiration Date:   4/9/2020 Laurence Davis MD 
4/9/2019

## 2019-04-09 NOTE — PATIENT INSTRUCTIONS
Clotting Factor Deficiencies: Care Instructions Your Care Instructions Clotting factors are substances in the blood that help stop bleeding after a cut or injury. They also prevent sudden bleeding. In people who have clotting factor problems, the clotting factors don't work right or, in some cases, are missing. When blood does not clot well, even minor injuries can cause serious bleeding. This can lead to blood loss, injury to internal organs, or damage to muscles or joints. Several conditions, including hemophilia, can make it hard for the blood to clot. Your doctor can treat you by giving you replacement clotting factors. You also may take medicine to prevent bleeding. You may often have clotting factors transfused into a vein to prevent bleeding, or you may get them as needed. You may eventually learn to do this at home. You can also try to prevent injuries that can cause you to bleed. Follow-up care is a key part of your treatment and safety. Be sure to make and go to all appointments, and call your doctor if you are having problems. It's also a good idea to know your test results and keep a list of the medicines you take. How can you care for yourself at home? · Take your medicines exactly as prescribed. Call your doctor if you think you are having a problem with your medicine. You will get more details on the specific medicines your doctor prescribes. · Stay at a healthy body weight. If you are overweight, the additional stress on joints can trigger bleeding. · Exercise safely. Avoid contact sports. Swim or walk to avoid excess pressure on your joints. Check with your doctor before doing activities that put you at high risk for falls, such as riding a bike. · Brush and floss your teeth daily. This may help you avoid problems that could lead to having a tooth pulled.  
· Avoid aspirin and nonsteroidal anti-inflammatory drugs (NSAIDs), such as ibuprofen (Advil, Motrin) or naproxen (Aleve). They can increase the chance of bleeding. · Take pain medicines exactly as directed. ? If the doctor gave you a prescription medicine for pain, take it as prescribed. ? If you are not taking a prescription pain medicine, ask your doctor if you can take an over-the-counter medicine. · Take care to prevent accidents at home: ? Make sure rugs are tacked down so you do not slip. ? Keep furniture with sharp edges out of pathways. ? Use nonskid floor wax. ? Wipe up spills quickly. ? If you live in an area that gets snow and ice in the winter, sprinkle salt on steps and sidewalks. ? Avoid loose-fitting shoes. You might lose your balance and fall. · Wear medical alert jewelry that lists your clotting problem. You can buy this at most drugstores. When should you call for help? Call 911 anytime you think you may need emergency care. For example, call if: 
  · You passed out (lost consciousness).  
  · You have signs of severe bleeding, which includes: ? You have a severe headache that is different from past headaches. ? You vomit blood or what looks like coffee grounds. ? Your stools are maroon or very bloody.  
 Call your doctor now or seek immediate medical care if: 
  · You are dizzy or lightheaded, or you feel like you may faint.  
  · You have abnormal bleeding, such as: ? A nosebleed that you can't easily stop. ? Your stools are black and look like tar, or they have streaks of blood. ? You have blood in your urine. ? You have joint pain. ? You have bruises or blood spots under your skin.  
 Watch closely for changes in your health, and be sure to contact your doctor if: 
  · You do not get better as expected. Where can you learn more? Go to http://mallorie-corry.info/. Enter L126 in the search box to learn more about \"Clotting Factor Deficiencies: Care Instructions. \" Current as of: May 6, 2018 Content Version: 11.9 © 7004-9717 [employer]wise, Incorporated. Care instructions adapted under license by [employer] (which disclaims liability or warranty for this information). If you have questions about a medical condition or this instruction, always ask your healthcare professional. [address] any warranty or liability for your use of this information.

## 2019-04-11 LAB
PROT C PPP-ACNC: 81 % (ref 73–180)
PROT S ACT/NOR PPP: 123 % (ref 57–157)
PROT S PPP-ACNC: 96 % (ref 60–150)

## 2019-04-12 LAB — PROT C AG PPP IA-ACNC: 80 % (ref 60–150)

## 2019-10-04 ENCOUNTER — OFFICE VISIT (OUTPATIENT)
Dept: ONCOLOGY | Age: 69
End: 2019-10-04

## 2019-10-04 ENCOUNTER — HOSPITAL ENCOUNTER (OUTPATIENT)
Dept: LAB | Age: 69
Discharge: HOME | End: 2019-10-04
Payer: MEDICARE

## 2019-10-04 VITALS
HEIGHT: 63 IN | SYSTOLIC BLOOD PRESSURE: 135 MMHG | HEART RATE: 82 BPM | OXYGEN SATURATION: 97 % | WEIGHT: 152.8 LBS | DIASTOLIC BLOOD PRESSURE: 74 MMHG | BODY MASS INDEX: 27.07 KG/M2 | TEMPERATURE: 98 F

## 2019-10-04 DIAGNOSIS — I85.00 PORTAL HYPERTENSION WITH ESOPHAGEAL VARICES (HCC): ICD-10-CM

## 2019-10-04 DIAGNOSIS — D68.59 PROTEIN C DEFICIENCY (HCC): Primary | ICD-10-CM

## 2019-10-04 DIAGNOSIS — D72.819 CHRONIC LEUKOPENIA: ICD-10-CM

## 2019-10-04 DIAGNOSIS — I86.4 GASTRIC VARICES: ICD-10-CM

## 2019-10-04 DIAGNOSIS — D68.69 ACQUIRED THROMBOPHILIA (HCC): ICD-10-CM

## 2019-10-04 DIAGNOSIS — I81 PORTAL VEIN THROMBOSIS: ICD-10-CM

## 2019-10-04 DIAGNOSIS — K76.6 PORTAL HYPERTENSION WITH ESOPHAGEAL VARICES (HCC): ICD-10-CM

## 2019-10-04 DIAGNOSIS — D68.59 PROTEIN C DEFICIENCY (HCC): ICD-10-CM

## 2019-10-04 LAB
ALBUMIN SERPL-MCNC: 4.2 G/DL (ref 3.4–5)
ALBUMIN/GLOB SERPL: 1.6 {RATIO} (ref 0.8–1.7)
ALP SERPL-CCNC: 91 U/L (ref 45–117)
ALT SERPL-CCNC: 43 U/L (ref 13–56)
ANION GAP SERPL CALC-SCNC: 9 MMOL/L (ref 3–18)
AST SERPL-CCNC: 27 U/L (ref 10–38)
BASOPHILS # BLD: 0 K/UL (ref 0–0.1)
BASOPHILS NFR BLD: 0 % (ref 0–2)
BILIRUB SERPL-MCNC: 0.8 MG/DL (ref 0.2–1)
BUN SERPL-MCNC: 10 MG/DL (ref 7–18)
BUN/CREAT SERPL: 16 (ref 12–20)
CALCIUM SERPL-MCNC: 9.5 MG/DL (ref 8.5–10.1)
CHLORIDE SERPL-SCNC: 106 MMOL/L (ref 100–111)
CO2 SERPL-SCNC: 27 MMOL/L (ref 21–32)
CREAT SERPL-MCNC: 0.64 MG/DL (ref 0.6–1.3)
DIFFERENTIAL METHOD BLD: ABNORMAL
EOSINOPHIL # BLD: 0 K/UL (ref 0–0.4)
EOSINOPHIL NFR BLD: 1 % (ref 0–5)
ERYTHROCYTE [DISTWIDTH] IN BLOOD BY AUTOMATED COUNT: 15.1 % (ref 11.6–14.5)
GLOBULIN SER CALC-MCNC: 2.7 G/DL (ref 2–4)
GLUCOSE SERPL-MCNC: 78 MG/DL (ref 74–99)
HCT VFR BLD AUTO: 42.9 % (ref 35–45)
HGB BLD-MCNC: 13.1 G/DL (ref 12–16)
LYMPHOCYTES # BLD: 1.6 K/UL (ref 0.9–3.6)
LYMPHOCYTES NFR BLD: 29 % (ref 21–52)
MCH RBC QN AUTO: 29.5 PG (ref 24–34)
MCHC RBC AUTO-ENTMCNC: 30.5 G/DL (ref 31–37)
MCV RBC AUTO: 96.6 FL (ref 74–97)
MONOCYTES # BLD: 0.7 K/UL (ref 0.05–1.2)
MONOCYTES NFR BLD: 13 % (ref 3–10)
NEUTS SEG # BLD: 3.2 K/UL (ref 1.8–8)
NEUTS SEG NFR BLD: 57 % (ref 40–73)
PLATELET # BLD AUTO: 274 K/UL (ref 135–420)
PMV BLD AUTO: 10.6 FL (ref 9.2–11.8)
POTASSIUM SERPL-SCNC: 4.3 MMOL/L (ref 3.5–5.5)
PROT SERPL-MCNC: 6.9 G/DL (ref 6.4–8.2)
RBC # BLD AUTO: 4.44 M/UL (ref 4.2–5.3)
SODIUM SERPL-SCNC: 142 MMOL/L (ref 136–145)
WBC # BLD AUTO: 5.5 K/UL (ref 4.6–13.2)

## 2019-10-04 PROCEDURE — 80053 COMPREHEN METABOLIC PANEL: CPT

## 2019-10-04 PROCEDURE — 85025 COMPLETE CBC W/AUTO DIFF WBC: CPT

## 2019-10-04 PROCEDURE — 36415 COLL VENOUS BLD VENIPUNCTURE: CPT

## 2019-10-04 PROCEDURE — 85302 CLOT INHIBIT PROT C ANTIGEN: CPT

## 2019-10-04 PROCEDURE — 85303 CLOT INHIBIT PROT C ACTIVITY: CPT

## 2019-10-04 NOTE — PATIENT INSTRUCTIONS
Clotting Factor Deficiencies: Care Instructions Your Care Instructions Clotting factors are substances in the blood that help stop bleeding after a cut or injury. They also prevent sudden bleeding. In people who have clotting factor problems, the clotting factors don't work right or, in some cases, are missing. When blood does not clot well, even minor injuries can cause serious bleeding. This can lead to blood loss, injury to internal organs, or damage to muscles or joints. Several conditions, including hemophilia, can make it hard for the blood to clot. Your doctor can treat you by giving you replacement clotting factors. You also may take medicine to prevent bleeding. You may often have clotting factors transfused into a vein to prevent bleeding, or you may get them as needed. You may eventually learn to do this at home. You can also try to prevent injuries that can cause you to bleed. Follow-up care is a key part of your treatment and safety. Be sure to make and go to all appointments, and call your doctor if you are having problems. It's also a good idea to know your test results and keep a list of the medicines you take. How can you care for yourself at home? · Take your medicines exactly as prescribed. Call your doctor if you think you are having a problem with your medicine. You will get more details on the specific medicines your doctor prescribes. · Stay at a healthy body weight. If you are overweight, the additional stress on joints can trigger bleeding. · Exercise safely. Avoid contact sports. Swim or walk to avoid excess pressure on your joints. Check with your doctor before doing activities that put you at high risk for falls, such as riding a bike. · Brush and floss your teeth daily. This may help you avoid problems that could lead to having a tooth pulled.  
· Avoid aspirin and nonsteroidal anti-inflammatory drugs (NSAIDs), such as ibuprofen (Advil, Motrin) or naproxen (Aleve). They can increase the chance of bleeding. · Take pain medicines exactly as directed. ? If the doctor gave you a prescription medicine for pain, take it as prescribed. ? If you are not taking a prescription pain medicine, ask your doctor if you can take an over-the-counter medicine. · Take care to prevent accidents at home: ? Make sure rugs are tacked down so you do not slip. ? Keep furniture with sharp edges out of pathways. ? Use nonskid floor wax. ? Wipe up spills quickly. ? If you live in an area that gets snow and ice in the winter, sprinkle salt on steps and sidewalks. ? Avoid loose-fitting shoes. You might lose your balance and fall. · Wear medical alert jewelry that lists your clotting problem. You can buy this at most drugstores. When should you call for help? Call 911 anytime you think you may need emergency care. For example, call if: 
  · You passed out (lost consciousness).  
  · You have signs of severe bleeding, which includes: ? You have a severe headache that is different from past headaches. ? You vomit blood or what looks like coffee grounds. ? Your stools are maroon or very bloody.  
 Call your doctor now or seek immediate medical care if: 
  · You are dizzy or lightheaded, or you feel like you may faint.  
  · You have abnormal bleeding, such as: ? A nosebleed that you can't easily stop. ? Your stools are black and look like tar, or they have streaks of blood. ? You have blood in your urine. ? You have joint pain. ? You have bruises or blood spots under your skin.  
 Watch closely for changes in your health, and be sure to contact your doctor if: 
  · You do not get better as expected. Where can you learn more? Go to http://mallorie-corry.info/. Enter G348 in the search box to learn more about \"Clotting Factor Deficiencies: Care Instructions. \" Current as of: March 28, 2019 Content Version: 12.2 © 5791-6601 Healthwise, Incorporated. Care instructions adapted under license by [employer] (which disclaims liability or warranty for this information). If you have questions about a medical condition or this instruction, always ask your healthcare professional. [address] any warranty or liability for your use of this information.

## 2019-10-05 LAB — PROT C PPP-ACNC: 78 % (ref 73–180)

## 2019-10-06 LAB — PROT C AG PPP IA-ACNC: 79 % (ref 60–150)

## 2020-04-10 ENCOUNTER — HOSPITAL ENCOUNTER (OUTPATIENT)
Dept: LAB | Age: 70
Discharge: HOME | End: 2020-04-10
Payer: MEDICARE

## 2020-04-10 ENCOUNTER — HOSPITAL ENCOUNTER (OUTPATIENT)
Dept: ONCOLOGY | Age: 70
Discharge: HOME | End: 2020-04-10

## 2020-04-10 ENCOUNTER — OFFICE VISIT (OUTPATIENT)
Dept: ONCOLOGY | Age: 70
End: 2020-04-10

## 2020-04-10 VITALS — DIASTOLIC BLOOD PRESSURE: 81 MMHG | HEART RATE: 60 BPM | SYSTOLIC BLOOD PRESSURE: 148 MMHG

## 2020-04-10 DIAGNOSIS — D68.69 ACQUIRED THROMBOPHILIA (HCC): ICD-10-CM

## 2020-04-10 DIAGNOSIS — I85.00 PORTAL HYPERTENSION WITH ESOPHAGEAL VARICES (HCC): ICD-10-CM

## 2020-04-10 DIAGNOSIS — D72.819 CHRONIC LEUKOPENIA: ICD-10-CM

## 2020-04-10 DIAGNOSIS — I86.4 GASTRIC VARICES: ICD-10-CM

## 2020-04-10 DIAGNOSIS — I81 PORTAL VEIN THROMBOSIS: ICD-10-CM

## 2020-04-10 DIAGNOSIS — D68.59 PROTEIN C DEFICIENCY (HCC): ICD-10-CM

## 2020-04-10 DIAGNOSIS — D68.59 PROTEIN C DEFICIENCY (HCC): Primary | ICD-10-CM

## 2020-04-10 DIAGNOSIS — K76.6 PORTAL HYPERTENSION WITH ESOPHAGEAL VARICES (HCC): ICD-10-CM

## 2020-04-10 LAB
ALBUMIN SERPL-MCNC: 4.2 G/DL (ref 3.4–5)
ALBUMIN/GLOB SERPL: 1.7 {RATIO} (ref 0.8–1.7)
ALP SERPL-CCNC: 82 U/L (ref 45–117)
ALT SERPL-CCNC: 36 U/L (ref 13–56)
ANION GAP SERPL CALC-SCNC: 7 MMOL/L (ref 3–18)
AST SERPL-CCNC: 21 U/L (ref 10–38)
BASO+EOS+MONOS # BLD AUTO: 0.4 K/UL (ref 0–2.3)
BASO+EOS+MONOS NFR BLD AUTO: 8 % (ref 0.1–17)
BILIRUB SERPL-MCNC: 1 MG/DL (ref 0.2–1)
BUN SERPL-MCNC: 13 MG/DL (ref 7–18)
BUN/CREAT SERPL: 21 (ref 12–20)
CALCIUM SERPL-MCNC: 9.8 MG/DL (ref 8.5–10.1)
CHLORIDE SERPL-SCNC: 108 MMOL/L (ref 100–111)
CO2 SERPL-SCNC: 29 MMOL/L (ref 21–32)
CREAT SERPL-MCNC: 0.61 MG/DL (ref 0.6–1.3)
DIFFERENTIAL METHOD BLD: NORMAL
ERYTHROCYTE [DISTWIDTH] IN BLOOD BY AUTOMATED COUNT: 12.9 % (ref 11.5–14.5)
GLOBULIN SER CALC-MCNC: 2.5 G/DL (ref 2–4)
GLUCOSE SERPL-MCNC: 95 MG/DL (ref 74–99)
HCT VFR BLD AUTO: 41.9 % (ref 36–48)
HGB BLD-MCNC: 13.4 G/DL (ref 12–16)
LYMPHOCYTES # BLD: 1.4 K/UL (ref 1.1–5.9)
LYMPHOCYTES NFR BLD: 27 % (ref 14–44)
MCH RBC QN AUTO: 30.3 PG (ref 25–35)
MCHC RBC AUTO-ENTMCNC: 32 G/DL (ref 31–37)
MCV RBC AUTO: 94.8 FL (ref 78–102)
NEUTS SEG # BLD: 3.2 K/UL (ref 1.8–9.5)
NEUTS SEG NFR BLD: 65 % (ref 40–70)
PLATELET # BLD AUTO: 240 K/UL (ref 140–440)
POTASSIUM SERPL-SCNC: 4.4 MMOL/L (ref 3.5–5.5)
PROT SERPL-MCNC: 6.7 G/DL (ref 6.4–8.2)
RBC # BLD AUTO: 4.42 M/UL (ref 4.1–5.1)
SODIUM SERPL-SCNC: 144 MMOL/L (ref 136–145)
WBC # BLD AUTO: 5 K/UL (ref 4.5–13)

## 2020-04-10 PROCEDURE — 80053 COMPREHEN METABOLIC PANEL: CPT

## 2020-04-10 PROCEDURE — 36415 COLL VENOUS BLD VENIPUNCTURE: CPT

## 2020-04-10 PROCEDURE — 85302 CLOT INHIBIT PROT C ANTIGEN: CPT

## 2020-04-10 PROCEDURE — 85303 CLOT INHIBIT PROT C ACTIVITY: CPT

## 2020-04-10 NOTE — PROGRESS NOTES
Hematology/Oncology  Progress Note    Name: Maite Collier  Date: 4/10/2020  : 1950    PCP: Lorie Loza M.D.     Ms. Rosaura Polanco is a 68year old female who was seen for management of her acquired thrombophilic disorder. The patient also has portal hypertension with esophageal varices, coronary artery disease, and borderline diabetes mellitus. She also has a documented protein C deficiency  Current therapy: None    Subjective:     Mrs. Rosaura Polanco is a 75-year-old [facility] [place] woman who has a thrombophilia disorder characterized by decreased and her protein C antigen and functional protein C levels. She also has a history of portal vein thrombosis. She has been doing well since she was last seen in clinic. Currently, she denies having abdominal pain, nausea, emesis, or diarrhea. She has multiple medical problems and is concerned about her cholesterol level, thyroid function,, and blood sugar level. She has previously had a myocardial infarction and is very interested in her lipid profile. The patient  also has a history of TIA as well. 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 Today she is reporting no new complaints. Since her last clinic visit she did not have any additional complaints of abdominal pain. She reports that she has not had any further problems with mild diverticulitis. She is continuing to do much better at this time. Today her only complaint is occasional shortness of breath after she exercises or walks for extended periods of time. No past medical history on file.   Past Surgical History:   Procedure Laterality Date    HX ACL RECONSTRUCTION       Social History     Socioeconomic History    Marital status:      Spouse name: Not on file    Number of children: Not on file    Years of education: Not on file    Highest education level: Not on file   Occupational History    Not on file   Social Needs    Financial resource strain: Not on file    Food insecurity     Worry: Not on file     Inability: Not on file    Transportation needs     Medical: Not on file     Non-medical: Not on file   Tobacco Use    Smoking status: Never Smoker    Smokeless tobacco: Never Used   Substance and Sexual Activity    Alcohol use: Yes     Comment: ocassionally    Drug use: Not on file    Sexual activity: Not on file   Lifestyle    Physical activity     Days per week: Not on file     Minutes per session: Not on file    Stress: Not on file   Relationships    Social connections     Talks on phone: Not on file     Gets together: Not on file     Attends Jew service: Not on file     Active member of club or organization: Not on file     Attends meetings of clubs or organizations: Not on file     Relationship status: Not on file    Intimate partner violence     Fear of current or ex partner: Not on file     Emotionally abused: Not on file     Physically abused: Not on file     Forced sexual activity: Not on file   Other Topics Concern    Not on file   Social History Narrative    Not on file     No family history on file. Current Outpatient Medications   Medication Sig Dispense Refill    ZOVIRAX 5 % topical cream AMANDA EXT AA Q 4 H PRN  1    VENTOLIN HFA 90 mcg/actuation inhaler       rosuvastatin (CRESTOR) 10 mg tablet Take 10 mg by mouth nightly.  ascorbic acid, vitamin C, (VITAMIN C) 500 mg tablet Take  by mouth.  famotidine (PEPCID) 20 mg tablet Take 20 mg by mouth two (2) times a day.  biotin 5,000 mcg TbDi Take  by mouth.  nadolol (CORGARD) 20 mg tablet Take  by mouth daily.  atorvastatin (LIPITOR) 20 mg tablet Take  by mouth daily.  DOCOSAHEXANOIC ACID/EPA (FISH OIL PO) Take  by mouth.  Flaxseed Oil Oil by Does Not Apply route.  cyanocobalamin (VITAMIN B12) 1,000 mcg/mL injection 1,000 mcg by IntraMUSCular route once.         cholecalciferol, vitamin D3, (VITAMIN D3) 2,000 unit Tab Take  by mouth.  aspirin 81 mg chewable tablet Take 81 mg by mouth daily.  montelukast (SINGULAIR) 10 mg tablet Take 10 mg by mouth daily.  metoprolol-XL (TOPROL XL) 25 mg XL tablet Take  by mouth daily.  docusate sodium (COLACE) 100 mg capsule Take 100 mg by mouth two (2) times a day. Review of Systems  Constitutional: The patient has complaints of occasional shortness of breath with exercise. HEENT: The patient denies recent head trauma, eye pain, blurred vision,  hearing deficit, oropharyngeal mucosal pain or lesions, and the patient denies throat pain or discomfort. Lymphatics: The patient denies palpable peripheral lymphadenopathy. Hematologic: The patient denies having bruising, bleeding, or progressive fatigue. Respiratory: Patient denies having shortness of breath, cough, sputum production, fever, or dyspnea on exertion. Cardiovascular: The patient denies having leg pain, leg swelling, heart palpitations, chest permit, chest pain, or lightheadedness. The patient denies having dyspnea on exertion. Gastrointestinal: The patient denies having nausea, emesis, or diarrhea. The patient denies having any hematemesis or blood in the stool. Genitourinary: Patient denies having urinary urgency, frequency, or dysuria. The patient denies having blood in the urine. Psychological: The patient denies having symptoms of nervousness, anxiety, depression, or thoughts of harming himself some of this. Skin: Patient denies having skin rashes, skin, ulcerations, or unexplained itching or pruritus. Musculoskeletal: The patient denies having pain in the joints, tenderness, or bones. Objective:     Visit Vitals  /81   Pulse 60     ECOG PS=0     Physical Exam:   Gen. Appearance: The patient is in no acute distress. Skin: There is no bruise or rash. HEENT: The exam is unremarkable. Neck: Supple without lymphadenopathy or thyromegaly.   Lungs: Clear to auscultation and percussion; there are no wheezes or rhonchi. Heart: Regular rate and rhythm; there are no murmurs, gallops, or rubs. Anterior chest wall and breast: Deferred. Abdomen: Bowel sounds are present and normal.  There is no guarding, tenderness, or hepatosplenomegaly. Extremities: There is no clubbing, cyanosis, or edema. Neurologic: There are no focal neurologic deficits. Lymphatics: There is no palpable peripheral lymphadenopathy, except for a palpable lesion in the right axilla which is concerning for a right axillary lymph node. Musculoskeletal: Patient has full range of motion at all joints. There is no focal joint tenderness. Psychological: The patient is oriented to person place and time. She is focused and coherent. Lab data: The CBC from from today, 4/10/2020, shows a WBC count of 5, hemoglobin is 13.4 g/dL, hematocrit 41.9%, and the platelet count is [phone]. Assessment:     1. Protein C deficiency ([facility] [address]    2. Acquired thrombophilia ([facility] [address]    3. Portal vein thrombosis    4. Portal hypertension with esophageal varices (HCC)    5. Gastric varices    6. Chronic leukopenia      The most recent protein C antigen level was 79%. Plan:   Portal vein thrombosis/hypertension with gastric varices: The clinical exam shows no evidence of recurrent pain or discomfort to suggest any evidence of recurrent splanchnic vessel thrombosis. A comprehensive metabolic panel with liver function enzymes will be checked at this time. The most recent liver enzymes from her clinic visit on 10/4/2019 were within normal limits. Thrombophilia/acquired thrombophilia/protein C deficiency: The studies  indicate that she has an acquired protein C deficiency based on previous lab data over the past couple of years. I have explained to the patient that if she develops  Any type of recurrent thromboembolic events, she will need to remain on anticoagulation indefinitely from that point on.   The most recent protein C antigen level from 10/4/2019 was normal at 79%. The functional protein C was normal at 78%. At this time I will recheck the protein C antigen and protein C activity levels. Gastric/esophageal varices(stable problem): The patient previously had an evaluation by the gastroenterologist and the variceal findings were without evidence of bleeding. She has not experienced any gastrointestinal bleeding over the past 6 months. Chronic leukopenia : I have explained to the patient that her previously recognized leukopenia has resolved with the current WBC count in the normal range at 5 with an absolute neutrophil count of 3.2, and an absolute lymphocyte count of 1.4. The patient will return to the clinic in 6 months with Dr. Leann Smith in the [facility] office.   Orders Placed This Encounter    COMPLETE CBC & AUTO DIFF WBC    [facility]House CBC ([facility])     Standing Status:   Future     Number of Occurrences:   1     Standing Expiration Date:   4/17/2020    PROTEIN C, TOTAL     Standing Status:   Future     Standing Expiration Date:   4/11/2021    PROTEIN C ACTIVITY     Standing Status:   Future     Standing Expiration Date:   9/25/1017    METABOLIC PANEL, COMPREHENSIVE     Standing Status:   Future     Standing Expiration Date:   4/11/2021       Otto Dill MD  4/10/2020

## 2020-04-11 LAB
PROT C AG PPP IA-ACNC: 60 % (ref 60–150)
PROT C PPP-ACNC: 94 % (ref 73–180)

## 2020-10-13 ENCOUNTER — HOSPITAL ENCOUNTER (OUTPATIENT)
Dept: INFUSION THERAPY | Age: 70
Discharge: HOME | End: 2020-10-13
Payer: MEDICARE

## 2020-10-13 ENCOUNTER — OFFICE VISIT (OUTPATIENT)
Age: 70
End: 2020-10-13
Payer: MEDICARE

## 2020-10-13 VITALS
SYSTOLIC BLOOD PRESSURE: 136 MMHG | DIASTOLIC BLOOD PRESSURE: 79 MMHG | WEIGHT: 151.2 LBS | BODY MASS INDEX: 26.79 KG/M2 | HEIGHT: 63 IN | OXYGEN SATURATION: 99 % | HEART RATE: 61 BPM | TEMPERATURE: 98 F

## 2020-10-13 VITALS
TEMPERATURE: 98 F | HEART RATE: 61 BPM | DIASTOLIC BLOOD PRESSURE: 79 MMHG | SYSTOLIC BLOOD PRESSURE: 136 MMHG | OXYGEN SATURATION: 99 %

## 2020-10-13 DIAGNOSIS — I85.00 ESOPHAGEAL VARICES WITHOUT BLEEDING, UNSPECIFIED ESOPHAGEAL VARICES TYPE (HCC): ICD-10-CM

## 2020-10-13 DIAGNOSIS — D72.819 CHRONIC LEUKOPENIA: ICD-10-CM

## 2020-10-13 DIAGNOSIS — I81 PORTAL VEIN THROMBOSIS: Primary | ICD-10-CM

## 2020-10-13 DIAGNOSIS — D68.59 THROMBOPHILIA (HCC): ICD-10-CM

## 2020-10-13 DIAGNOSIS — I81 PORTAL VEIN THROMBOSIS: ICD-10-CM

## 2020-10-13 LAB
ALBUMIN SERPL-MCNC: 4.7 G/DL (ref 3.4–5)
ALBUMIN/GLOB SERPL: 1.3 {RATIO} (ref 0.8–1.7)
ALP SERPL-CCNC: 98 U/L (ref 45–117)
ALT SERPL-CCNC: 54 U/L (ref 13–56)
ANION GAP SERPL CALC-SCNC: 3 MMOL/L (ref 3–18)
APTT PPP: 35.9 SEC (ref 23–36.4)
AST SERPL-CCNC: 28 U/L (ref 10–38)
BASOPHILS # BLD: 0 K/UL (ref 0–0.1)
BASOPHILS NFR BLD: 0 % (ref 0–2)
BILIRUB SERPL-MCNC: 1.1 MG/DL (ref 0.2–1)
BUN SERPL-MCNC: 11 MG/DL (ref 7–18)
BUN/CREAT SERPL: 17 (ref 12–20)
CALCIUM SERPL-MCNC: 10.1 MG/DL (ref 8.5–10.1)
CHLORIDE SERPL-SCNC: 107 MMOL/L (ref 100–111)
CO2 SERPL-SCNC: 29 MMOL/L (ref 21–32)
CREAT SERPL-MCNC: 0.64 MG/DL (ref 0.6–1.3)
DIFFERENTIAL METHOD BLD: ABNORMAL
EOSINOPHIL # BLD: 0 K/UL (ref 0–0.4)
EOSINOPHIL NFR BLD: 0 % (ref 0–5)
ERYTHROCYTE [DISTWIDTH] IN BLOOD BY AUTOMATED COUNT: 13.8 % (ref 11.6–14.5)
GLOBULIN SER CALC-MCNC: 3.7 G/DL (ref 2–4)
GLUCOSE SERPL-MCNC: 98 MG/DL (ref 74–99)
HCT VFR BLD AUTO: 47 % (ref 35–45)
HGB BLD-MCNC: 15.2 G/DL (ref 12–16)
INR PPP: 1 (ref 0.8–1.2)
LYMPHOCYTES # BLD: 1.4 K/UL (ref 0.9–3.6)
LYMPHOCYTES NFR BLD: 24 % (ref 21–52)
MCH RBC QN AUTO: 30.6 PG (ref 24–34)
MCHC RBC AUTO-ENTMCNC: 32.3 G/DL (ref 31–37)
MCV RBC AUTO: 94.8 FL (ref 74–97)
MONOCYTES # BLD: 0.5 K/UL (ref 0.05–1.2)
MONOCYTES NFR BLD: 8 % (ref 3–10)
NEUTS SEG # BLD: 4 K/UL (ref 1.8–8)
NEUTS SEG NFR BLD: 68 % (ref 40–73)
PLATELET # BLD AUTO: 283 K/UL (ref 135–420)
PMV BLD AUTO: 11.3 FL (ref 9.2–11.8)
POTASSIUM SERPL-SCNC: 3.7 MMOL/L (ref 3.5–5.5)
PROT SERPL-MCNC: 8.4 G/DL (ref 6.4–8.2)
PROTHROMBIN TIME: 13.5 SEC (ref 11.5–15.2)
RBC # BLD AUTO: 4.96 M/UL (ref 4.2–5.3)
SODIUM SERPL-SCNC: 139 MMOL/L (ref 136–145)
WBC # BLD AUTO: 5.9 K/UL (ref 4.6–13.2)

## 2020-10-13 PROCEDURE — 88184 FLOWCYTOMETRY/ TC 1 MARKER: CPT

## 2020-10-13 PROCEDURE — G8419 CALC BMI OUT NRM PARAM NOF/U: HCPCS | Performed by: INTERNAL MEDICINE

## 2020-10-13 PROCEDURE — 85730 THROMBOPLASTIN TIME PARTIAL: CPT

## 2020-10-13 PROCEDURE — 99214 OFFICE O/P EST MOD 30 MIN: CPT | Performed by: INTERNAL MEDICINE

## 2020-10-13 PROCEDURE — 86146 BETA-2 GLYCOPROTEIN ANTIBODY: CPT

## 2020-10-13 PROCEDURE — 1090F PRES/ABSN URINE INCON ASSESS: CPT | Performed by: INTERNAL MEDICINE

## 2020-10-13 PROCEDURE — G8536 NO DOC ELDER MAL SCRN: HCPCS | Performed by: INTERNAL MEDICINE

## 2020-10-13 PROCEDURE — G8432 DEP SCR NOT DOC, RNG: HCPCS | Performed by: INTERNAL MEDICINE

## 2020-10-13 PROCEDURE — G0463 HOSPITAL OUTPT CLINIC VISIT: HCPCS | Performed by: INTERNAL MEDICINE

## 2020-10-13 PROCEDURE — 1101F PT FALLS ASSESS-DOCD LE1/YR: CPT | Performed by: INTERNAL MEDICINE

## 2020-10-13 PROCEDURE — 85610 PROTHROMBIN TIME: CPT

## 2020-10-13 PROCEDURE — 36415 COLL VENOUS BLD VENIPUNCTURE: CPT

## 2020-10-13 PROCEDURE — G8427 DOCREV CUR MEDS BY ELIG CLIN: HCPCS | Performed by: INTERNAL MEDICINE

## 2020-10-13 PROCEDURE — 85025 COMPLETE CBC W/AUTO DIFF WBC: CPT

## 2020-10-13 PROCEDURE — 85300 ANTITHROMBIN III ACTIVITY: CPT

## 2020-10-13 PROCEDURE — 86147 CARDIOLIPIN ANTIBODY EA IG: CPT

## 2020-10-13 PROCEDURE — 3017F COLORECTAL CA SCREEN DOC REV: CPT | Performed by: INTERNAL MEDICINE

## 2020-10-13 PROCEDURE — 80053 COMPREHEN METABOLIC PANEL: CPT

## 2020-10-13 PROCEDURE — 85613 RUSSELL VIPER VENOM DILUTED: CPT

## 2020-10-13 NOTE — PROGRESS NOTES
[facility] Progress Note    Date: 2020    Name: Ulises Haley    MRN: [id number]         : 1950    Peripheral Lab Draw      Ms. Lara to [facility], ambulatory at 1116 accompanied by self. Pt was assessed and education was provided. Ms. Seema Mckoy vitals were reviewed and patient was observed for 5 minutes prior to treatment. Visit Vitals  /79   Pulse 61   Temp 98 °F (36.7 °C) (Oral)   SpO2 99%     Recent Results (from the past 12 hour(s))   PROTHROMBIN TIME + INR    Collection Time: 10/13/20 11:36 AM   Result Value Ref Range    Prothrombin time 13.5 11.5 - 15.2 sec    INR 1.0 0.8 - 1.2     PTT    Collection Time: 10/13/20 11:36 AM   Result Value Ref Range    aPTT 35.9 23.0 - 43.0 SEC   METABOLIC PANEL, COMPREHENSIVE    Collection Time: 10/13/20 11:36 AM   Result Value Ref Range    Sodium 139 136 - 145 mmol/L    Potassium 3.7 3.5 - 5.5 mmol/L    Chloride 107 100 - 111 mmol/L    CO2 29 21 - 32 mmol/L    Anion gap 3 3.0 - 18 mmol/L    Glucose 98 74 - 99 mg/dL    BUN 11 7.0 - 18 MG/DL    Creatinine 0.64 0.6 - 1.3 MG/DL    BUN/Creatinine ratio 17 12 - 20      GFR est AA >60 >60 ml/min/1.73m2    GFR est non-AA >60 >60 ml/min/1.73m2    Calcium 10.1 8.5 - 10.1 MG/DL    Bilirubin, total 1.1 (H) 0.2 - 1.0 MG/DL    ALT (SGPT) 54 13 - 56 U/L    AST (SGOT) 28 10 - 38 U/L    Alk.  phosphatase 98 45 - 117 U/L    Protein, total 8.4 (H) 6.4 - 8.2 g/dL    Albumin 4.7 3.4 - 5.0 g/dL    Globulin 3.7 2.0 - 4.0 g/dL    A-G Ratio 1.3 0.8 - 1.7     CBC WITH AUTOMATED DIFF    Collection Time: 10/13/20 11:36 AM   Result Value Ref Range    WBC 5.9 4.6 - 13.2 K/uL    RBC 4.96 4.20 - 5.30 M/uL    HGB 15.2 12.0 - 16.0 g/dL    HCT 47.0 (H) 35.0 - 45.0 %    MCV 94.8 74.0 - 97.0 FL    MCH 30.6 24.0 - 34.0 PG    MCHC 32.3 31.0 - 37.0 g/dL    RDW 13.8 11.6 - 14.5 %    PLATELET 071 615 - 514 K/uL    MPV 11.3 9.2 - 11.8 FL    NEUTROPHILS 68 40 - 73 %    LYMPHOCYTES 24 21 - 52 %    MONOCYTES 8 3 - 10 %    EOSINOPHILS 0 0 - 5 %    BASOPHILS 0 0 - 2 %    ABS. NEUTROPHILS 4.0 1.8 - 8.0 K/UL    ABS. LYMPHOCYTES 1.4 0.9 - 3.6 K/UL    ABS. MONOCYTES 0.5 0.05 - 1.2 K/UL    ABS. EOSINOPHILS 0.0 0.0 - 0.4 K/UL    ABS. BASOPHILS 0.0 0.0 - 0.1 K/UL    DF AUTOMATED         Blood obtained peripherally from right arm x 1 attempt with butterfly needle and sent to lab for Cbc w/diff, Cmp, PT-INR, PTT, Lupus Anticoagulant, B-2 Glycoprotein, Cardiolipin AB, IGG and IGM, Antithrombin III, Factor V, Factor IIand Parox Nocturnal  per written orders. No bleeding or hematoma noted at site. Gauze and coban applied. Ms. Tessa Oropeza tolerated the brooks puncture, and had no complaints. Patient armband removed and shredded. Ms. Tessa Oropeza was discharged from [place] [address] in stable condition at [phone].     Bria Harris Phlebotomist PCT  October 13, 2020  1:10 PM

## 2020-10-13 NOTE — PROGRESS NOTES
Hematology/Oncology  Progress Note     Name: Namita Poster  Date: 10/13/20  : 1950     PCP: Cintia Carlin M.D.      Ms. Irasema Chandler is a 68year old female who was seen for management of her acquired thrombophilic disorder. The patient also has portal hypertension with esophageal varices, coronary artery disease, and borderline diabetes mellitus. She also has a documented protein C deficiency  Current therapy: None     Subjective:      Mrs. Irasema Chandler is a 64-year-old [facility] American woman who has a thrombophilia disorder characterized by decreased and her protein C antigen and functional protein C levels. She also has a history of portal vein thrombosis. She has been doing well since she was last seen in clinic. Currently, she denies having abdominal pain, nausea, emesis, or diarrhea. She has multiple medical problems and is concerned about her cholesterol level, thyroid function,, and blood sugar level. She has previously had a myocardial infarction and is very interested in her lipid profile. The patient  also has a history of TIA as well. 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 Today she is reporting no new complaints. Since her last clinic visit she did not have any additional complaints of abdominal pain. She reports that she has not had any further problems with mild diverticulitis. She is continuing to do much better at this time. Today her only complaint is occasional shortness of breath after she exercises or walks for extended periods of time.         History reviewed. No pertinent past medical history.   Past Surgical History:   Procedure Laterality Date    HX ACL RECONSTRUCTION       Social History     Socioeconomic History    Marital status:      Spouse name: Not on file    Number of children: Not on file    Years of education: Not on file    Highest education level: Not on file   Tobacco Use    Smoking status: Never Smoker    Smokeless tobacco: Never Used   Substance and Sexual Activity    Alcohol use: Yes     Comment: ocassionally    Drug use: Never     No family history on file. Current Outpatient Medications   Medication Sig Dispense Refill    ZOVIRAX 5 % topical cream AMANDA EXT AA Q 4 H PRN  1    VENTOLIN HFA 90 mcg/actuation inhaler       rosuvastatin (CRESTOR) 10 mg tablet Take 10 mg by mouth nightly.  ascorbic acid, vitamin C, (VITAMIN C) 500 mg tablet Take  by mouth.  famotidine (PEPCID) 20 mg tablet Take 20 mg by mouth two (2) times a day.  biotin 5,000 mcg TbDi Take  by mouth.  nadolol (CORGARD) 20 mg tablet Take  by mouth daily.  atorvastatin (LIPITOR) 20 mg tablet Take  by mouth daily.  DOCOSAHEXANOIC ACID/EPA (FISH OIL PO) Take  by mouth.  Flaxseed Oil Oil by Does Not Apply route.  cyanocobalamin (VITAMIN B12) 1,000 mcg/mL injection 1,000 mcg by IntraMUSCular route once.  cholecalciferol, vitamin D3, (VITAMIN D3) 2,000 unit Tab Take  by mouth.  aspirin 81 mg chewable tablet Take 81 mg by mouth daily.  montelukast (SINGULAIR) 10 mg tablet Take 10 mg by mouth daily.  metoprolol-XL (TOPROL XL) 25 mg XL tablet Take  by mouth daily.  docusate sodium (COLACE) 100 mg capsule Take 100 mg by mouth two (2) times a day.          Allergies   Allergen Reactions    Demerol [Meperidine] Nausea and Vomiting     Low to awake from sleeping    Fentanyl Nausea and Vomiting     Low to awake from sleeping       Review of Systems    ROS       Objective:  Visit Vitals  /79   Pulse 61   Temp 98 °F (36.7 °C)   Ht 5' 2.5\" (1.588 m)   Wt 68.6 kg (151 lb 3.2 oz)   SpO2 99%   BMI 27.21 kg/m²         Physical Exam:   General appearance - alert, well appearing, and in no distress  Mental status - alert, oriented to person, place, and time  EYE-ELISEO, EOMI  ENT-ENT exam normal, no neck nodes or sinus tenderness  Mouth - mucous membranes moist, pharynx normal without lesions  Neck - supple, no significant adenopathy   Chest - clear to auscultation, no wheezes, rales or rhonchi, symmetric air entry   Heart - normal rate and regular rhythm   Abdomen - soft, nontender, nondistended, no masses or organomegaly  Lymph- no adenopathy palpable  Ext-no pedal edema noted  Skin-Warm and dry. Neuro -alert, oriented, normal speech, no focal findings or movement disorder noted  Breast - no masses palapated b/l    Physical Exam     Diagnostic Imaging     Results for orders placed during the hospital encounter of 08/23/11   IR BIOPSY/ASPIRATION/DRAINAGE    Narrative Ordering MD: Mikael Serrano MD  Signed By: [place] MD  ** FINAL **  ---------------------------------------------------------------------  Procedure Date:  08/23/2011   Accession Number:  [id number]           Order No:   [id number]         Procedure:   SPV - BIOPSY/ASPIRATION/DRAINAGE        CPT Code:   18291      Admit Diag:   TRANSJUGULAR LIVER              Reason:   LIVER BIOPSY WITH PRESSURES  INTERPRETATION:  Preprocedure diagnosis: Portal vein occlusion with recanalization of   the portal veins with collateral.  Post procedure diagnosis: Low hepatic wedge pressure. Post   transjugular liver biopsy. Assistant: None. Estimated blood loss: Minimal.  Procedure: Risks of the procedure were explained to patient and   written informed consent obtained. Patient was placed in a supine   position. By 7 and neck was prepped and draped in sterile fashion. Right internal jugular vein was entered with ultrasound guidance. 8 Danish sheath was inserted. 5 Danish catheter was advanced into   the middle hepatic vein and the tip was advanced distally an wedged   in the hepatic vein. Contrast injection showed wedged position of   the catheter tip. Pressure measurements were then obtained which   showed that the wedge pressure was 11. The IVC pressure was 8.  There   is therefore minimal portal venous gradient. Biopsy cannula was inserted and a total of 4 passes were made into   the liver. Sample were submitted in formalin. As  Catheter was reinserted and wedged in the distal hepatic vein. CO2   angiogram was performed in an attempt to reflux into the portal   vein. The CO2 preferentially emptied through the hepatic veins and   the portal vein was not opacified. Complications: None. Tissue removed: Four samples of the liver were submitted in formalin. Drainage tube: None. Condition: Stable and unchanged. Disposition: Patient was discharged after the procedure. IMPRESSION: Hepatic wedge pressure was not elevated. However this   may not be an accurate indication of portal venous pressure in this   patient with portal vein occlusion. Elevated pressure from the   portal venous system may not transmit through the occluded segment. CO2 angiogram did not fill the portal vein. Ordinarily, CO2 readily   opacifies the portal vein. This also indicates occlusion of the   portal vein. No results found for this or any previous visit. Results for orders placed in visit on 03/15/18   CT CHEST W WO CONT       Lab Results  Lab Results   Component Value Date/Time    WBC 5.0 04/10/2020 09:50 AM    HGB 13.4 04/10/2020 09:50 AM    HCT 41.9 04/10/2020 09:50 AM    PLATELET 288 17/62/4569 09:50 AM    MCV 94.8 04/10/2020 09:50 AM       Lab Results   Component Value Date/Time    Sodium 144 04/10/2020 09:50 AM    Potassium 4.4 04/10/2020 09:50 AM    Chloride 108 04/10/2020 09:50 AM    CO2 29 04/10/2020 09:50 AM    Anion gap 7 04/10/2020 09:50 AM    Glucose 95 04/10/2020 09:50 AM    BUN 13 04/10/2020 09:50 AM    Creatinine 0.61 04/10/2020 09:50 AM    BUN/Creatinine ratio 21 (H) 04/10/2020 09:50 AM    GFR est AA >60 04/10/2020 09:50 AM    GFR est non-AA >60 04/10/2020 09:50 AM    Calcium 9.8 04/10/2020 09:50 AM    Alk.  phosphatase 82 04/10/2020 09:50 AM    Protein, total 6.7 04/10/2020 09:50 AM    Albumin 4.2 04/10/2020 09:50 AM    Globulin 2.5 04/10/2020 09:50 AM    A-G Ratio 1.7 04/10/2020 09:50 AM    ALT (SGPT) 36 04/10/2020 09:50 AM       Assessment/Plan:    ICD-10-CM ICD-9-CM    1. Portal vein thrombosis  I81 452 PAROX NOCTURNAL HEMOGLOBINURIA      FACTOR V LEIDEN      FACTOR II  DNA ANALYSIS      ANTITHROMBIN III ACTIVITY      CARDIOLIPIN AB, IGM      CARDIOLIPIN AB, IGG      B-2 GLYCOPROTEIN AB, IGG/IGM      LUPUS ANTICOAGULANT COMP PANEL      PROTHROMBIN TIME + INR      PTT   2. Thrombophilia (HCC)  D68.59 289.81 PAROX NOCTURNAL HEMOGLOBINURIA      FACTOR V LEIDEN      FACTOR II  DNA ANALYSIS      ANTITHROMBIN III ACTIVITY      CARDIOLIPIN AB, IGM      CARDIOLIPIN AB, IGG      B-2 GLYCOPROTEIN AB, IGG/IGM      LUPUS ANTICOAGULANT COMP PANEL      PROTHROMBIN TIME + INR      PTT   3. Esophageal varices without bleeding, unspecified esophageal varices type (HCC)  I85.00 456.1 PAROX NOCTURNAL HEMOGLOBINURIA      FACTOR V LEIDEN      FACTOR II  DNA ANALYSIS      ANTITHROMBIN III ACTIVITY      CARDIOLIPIN AB, IGM      CARDIOLIPIN AB, IGG      B-2 GLYCOPROTEIN AB, IGG/IGM      LUPUS ANTICOAGULANT COMP PANEL      PROTHROMBIN TIME + INR      PTT   4.  Chronic leukopenia  D72.819 288.50 CBC WITH AUTOMATED DIFF      METABOLIC PANEL, COMPREHENSIVE     Orders Placed This Encounter    PAROX NOCTURNAL HEMOGLOBINURIA     Standing Status:   Future     Standing Expiration Date:   10/14/2021    FACTOR V LEIDEN     Standing Status:   Future     Standing Expiration Date:   10/14/2021    FACTOR II  DNA ANALYSIS     Standing Status:   Future     Standing Expiration Date:   10/14/2021    ANTITHROMBIN III ACTIVITY     Standing Status:   Future     Standing Expiration Date:   10/14/2021    CARDIOLIPIN AB, IGM     Standing Status:   Future     Standing Expiration Date:   10/14/2021    CARDIOLIPIN AB, IGG     Standing Status:   Future     Standing Expiration Date:   10/14/2021    B-2 GLYCOPROTEIN AB, IGG/IGM     Standing Status:   Future Standing Expiration Date:   10/14/2021    LUPUS ANTICOAGULANT COMP PANEL     Standing Status:   Future     Standing Expiration Date:   10/14/2021    PROTHROMBIN TIME + INR     Standing Status:   Future     Standing Expiration Date:   10/14/2021    PTT     Standing Status:   Future     Standing Expiration Date:   10/14/2021    CBC WITH AUTOMATED DIFF     Standing Status:   Future     Standing Expiration Date:   98/41/9015    METABOLIC PANEL, COMPREHENSIVE     Standing Status:   Future     Standing Expiration Date:   10/14/2021     Portal vein thrombosis/hypertension with gastric varices: F/U with GI. Check PNH and APLAS w/u     Thrombophilia/acquired thrombophilia/protein C deficiency:I am not convinced that she has protein C deficiency with her previous blood work result available. W/u for PNH and APLS    Gastric/esophageal varices(stable problem): F/U with GI    Chronic leukopenia :Resolved    RTC 2 weeks     have labs drawn prior to ROV  There are no Patient Instructions on file for this visit. Follow-up and Dispositions    · Return in about 2 weeks (around 10/27/2020).          Rhonda Mcgregor MD

## 2020-10-15 LAB
B2 GLYCOPROT1 IGG SER-ACNC: <9 GPI IGG UNITS (ref 0–20)
B2 GLYCOPROT1 IGM SER-ACNC: <9 GPI IGM UNITS (ref 0–32)
CARDIOLIPIN IGG SER IA-ACNC: 13 GPL U/ML (ref 0–14)

## 2020-10-16 LAB — CARDIOLIPIN IGM SER IA-ACNC: <9 MPL U/ML (ref 0–12)

## 2020-10-19 LAB
AT III PPP CHRO-ACNC: NORMAL %
CD59 CELLS BLD QL: NORMAL
F5 GENE MUT ANL BLD/T: NORMAL

## 2020-10-20 LAB — F2 GENE MUT ANL BLD/T: NORMAL

## 2020-10-27 ENCOUNTER — OFFICE VISIT (OUTPATIENT)
Age: 70
End: 2020-10-27
Payer: MEDICARE

## 2020-10-27 VITALS
TEMPERATURE: 98.2 F | DIASTOLIC BLOOD PRESSURE: 71 MMHG | SYSTOLIC BLOOD PRESSURE: 126 MMHG | WEIGHT: 151.4 LBS | HEART RATE: 64 BPM | HEIGHT: 62 IN | OXYGEN SATURATION: 100 % | BODY MASS INDEX: 27.86 KG/M2

## 2020-10-27 DIAGNOSIS — I81 PORTAL VEIN THROMBOSIS: Primary | ICD-10-CM

## 2020-10-27 DIAGNOSIS — I85.00 PORTAL HYPERTENSION WITH ESOPHAGEAL VARICES (HCC): ICD-10-CM

## 2020-10-27 DIAGNOSIS — D72.819 CHRONIC LEUKOPENIA: ICD-10-CM

## 2020-10-27 DIAGNOSIS — K76.6 PORTAL HYPERTENSION WITH ESOPHAGEAL VARICES (HCC): ICD-10-CM

## 2020-10-27 PROCEDURE — G8536 NO DOC ELDER MAL SCRN: HCPCS | Performed by: INTERNAL MEDICINE

## 2020-10-27 PROCEDURE — 1090F PRES/ABSN URINE INCON ASSESS: CPT | Performed by: INTERNAL MEDICINE

## 2020-10-27 PROCEDURE — G8510 SCR DEP NEG, NO PLAN REQD: HCPCS | Performed by: INTERNAL MEDICINE

## 2020-10-27 PROCEDURE — G0463 HOSPITAL OUTPT CLINIC VISIT: HCPCS | Performed by: INTERNAL MEDICINE

## 2020-10-27 PROCEDURE — G8427 DOCREV CUR MEDS BY ELIG CLIN: HCPCS | Performed by: INTERNAL MEDICINE

## 2020-10-27 PROCEDURE — 99213 OFFICE O/P EST LOW 20 MIN: CPT | Performed by: INTERNAL MEDICINE

## 2020-10-27 PROCEDURE — 3017F COLORECTAL CA SCREEN DOC REV: CPT | Performed by: INTERNAL MEDICINE

## 2020-10-27 PROCEDURE — 1101F PT FALLS ASSESS-DOCD LE1/YR: CPT | Performed by: INTERNAL MEDICINE

## 2020-10-27 PROCEDURE — G8419 CALC BMI OUT NRM PARAM NOF/U: HCPCS | Performed by: INTERNAL MEDICINE

## 2020-10-27 NOTE — PROGRESS NOTES
Hematology/Oncology  Progress Note     Name: Maura Gong  Date: 10/27/20  : 1950     PCP: Donya Xie M.D.      Ms. Jose J Jaime is a 68year old female who was seen for management of her acquired thrombophilic disorder. The patient also has portal hypertension with esophageal varices, coronary artery disease, and borderline diabetes mellitus. She also has a documented protein C deficiency  Current therapy: None     Subjective:      Mrs. Jose J Jaime is a 77-year-old [facility] American woman who has a thrombophilia disorder characterized by decreased and her protein C antigen and functional protein C levels. She also has a history of portal vein thrombosis. She has been doing well since she was last seen in clinic. Currently, she denies having abdominal pain, nausea, emesis, or diarrhea. She has multiple medical problems and is concerned about her cholesterol level, thyroid function,, and blood sugar level. She has previously had a myocardial infarction and is very interested in her lipid profile. The patient  also has a history of TIA as well. 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 Today she is reporting no new complaints. Since her last clinic visit she did not have any additional complaints of abdominal pain. She reports that she has not had any further problems with mild diverticulitis. She is continuing to do much better at this time. Today her only complaint is occasional shortness of breath after she exercises or walks for extended periods of time.         No past medical history on file.   Past Surgical History:   Procedure Laterality Date    HX ACL RECONSTRUCTION       Social History     Socioeconomic History    Marital status:      Spouse name: Not on file    Number of children: Not on file    Years of education: Not on file    Highest education level: Not on file   Tobacco Use    Smoking status: Never Smoker    Smokeless tobacco: Never Used   Substance and Sexual Activity    Alcohol use: Yes     Comment: ocassionally    Drug use: Never     No family history on file. Current Outpatient Medications   Medication Sig Dispense Refill    ZOVIRAX 5 % topical cream AMANDA EXT AA Q 4 H PRN  1    VENTOLIN HFA 90 mcg/actuation inhaler       rosuvastatin (CRESTOR) 10 mg tablet Take 10 mg by mouth nightly.  ascorbic acid, vitamin C, (VITAMIN C) 500 mg tablet Take  by mouth.  famotidine (PEPCID) 20 mg tablet Take 20 mg by mouth two (2) times a day.  biotin 5,000 mcg TbDi Take  by mouth.  nadolol (CORGARD) 20 mg tablet Take  by mouth daily.  atorvastatin (LIPITOR) 20 mg tablet Take  by mouth daily.  DOCOSAHEXANOIC ACID/EPA (FISH OIL PO) Take  by mouth.  Flaxseed Oil Oil by Does Not Apply route.  cyanocobalamin (VITAMIN B12) 1,000 mcg/mL injection 1,000 mcg by IntraMUSCular route once.  cholecalciferol, vitamin D3, (VITAMIN D3) 2,000 unit Tab Take  by mouth.  aspirin 81 mg chewable tablet Take 81 mg by mouth daily.  montelukast (SINGULAIR) 10 mg tablet Take 10 mg by mouth daily.  metoprolol-XL (TOPROL XL) 25 mg XL tablet Take  by mouth daily.  docusate sodium (COLACE) 100 mg capsule Take 100 mg by mouth two (2) times a day. Allergies   Allergen Reactions    Demerol [Meperidine] Nausea and Vomiting     Low to awake from sleeping    Fentanyl Nausea and Vomiting     Low to awake from sleeping       Review of Systems    ROS       Objective: There were no vitals taken for this visit.       Physical Exam:   General appearance - alert, well appearing, and in no distress  Mental status - alert, oriented to person, place, and time  EYE-ELISEO, EOMI  ENT-ENT exam normal, no neck nodes or sinus tenderness  Mouth - mucous membranes moist, pharynx normal without lesions  Neck - supple, no significant adenopathy   Chest - clear to auscultation, no wheezes, rales or rhonchi, symmetric air entry   Heart - normal rate and regular rhythm   Abdomen - soft, nontender, nondistended, no masses or organomegaly  Lymph- no adenopathy palpable  Ext-no pedal edema noted  Skin-Warm and dry. Neuro -alert, oriented, normal speech, no focal findings or movement disorder noted  Breast - no masses palapated b/l    Physical Exam     Diagnostic Imaging     Results for orders placed during the hospital encounter of 08/23/11   IR BIOPSY/ASPIRATION/DRAINAGE    Narrative Ordering MD: Jeffrey Bhatti MD  Signed By: Ashok Fisher MD  ** FINAL **  ---------------------------------------------------------------------  Procedure Date:  08/23/2011   Accession Number:  [id number]           Order No:   [id number]         Procedure:   SPV - BIOPSY/ASPIRATION/DRAINAGE        CPT Code:   87556      Admit Diag:   TRANSJUGULAR LIVER              Reason:   LIVER BIOPSY WITH PRESSURES  INTERPRETATION:  Preprocedure diagnosis: Portal vein occlusion with recanalization of   the portal veins with collateral.  Post procedure diagnosis: Low hepatic wedge pressure. Post   transjugular liver biopsy. Assistant: None. Estimated blood loss: Minimal.  Procedure: Risks of the procedure were explained to patient and   written informed consent obtained. Patient was placed in a supine   position. By 7 and neck was prepped and draped in sterile fashion. Right internal jugular vein was entered with ultrasound guidance. 8 Spanish sheath was inserted. 5 Spanish catheter was advanced into   the middle hepatic vein and the tip was advanced distally an wedged   in the hepatic vein. Contrast injection showed wedged position of   the catheter tip. Pressure measurements were then obtained which   showed that the wedge pressure was 11. The IVC pressure was 8. There   is therefore minimal portal venous gradient. Biopsy cannula was inserted and a total of 4 passes were made into   the liver. Sample were submitted in formalin. As  Catheter was reinserted and wedged in the distal hepatic vein. CO2   angiogram was performed in an attempt to reflux into the portal   vein. The CO2 preferentially emptied through the hepatic veins and   the portal vein was not opacified. Complications: None. Tissue removed: Four samples of the liver were submitted in formalin. Drainage tube: None. Condition: Stable and unchanged. Disposition: Patient was discharged after the procedure. IMPRESSION: Hepatic wedge pressure was not elevated. However this   may not be an accurate indication of portal venous pressure in this   patient with portal vein occlusion. Elevated pressure from the   portal venous system may not transmit through the occluded segment. CO2 angiogram did not fill the portal vein. Ordinarily, CO2 readily   opacifies the portal vein. This also indicates occlusion of the   portal vein. No results found for this or any previous visit. Results for orders placed in visit on 03/15/18   CT CHEST W WO CONT       Lab Results  Lab Results   Component Value Date/Time    WBC 5.9 10/13/2020 11:36 AM    HGB 15.2 10/13/2020 11:36 AM    HCT 47.0 (H) 10/13/2020 11:36 AM    PLATELET 938 60/47/1301 11:36 AM    MCV 94.8 10/13/2020 11:36 AM       Lab Results   Component Value Date/Time    Sodium 139 10/13/2020 11:36 AM    Potassium 3.7 10/13/2020 11:36 AM    Chloride 107 10/13/2020 11:36 AM    CO2 29 10/13/2020 11:36 AM    Anion gap 3 10/13/2020 11:36 AM    Glucose 98 10/13/2020 11:36 AM    BUN 11 10/13/2020 11:36 AM    Creatinine 0.64 10/13/2020 11:36 AM    BUN/Creatinine ratio 17 10/13/2020 11:36 AM    GFR est AA >60 10/13/2020 11:36 AM    GFR est non-AA >60 10/13/2020 11:36 AM    Calcium 10.1 10/13/2020 11:36 AM    Alk.  phosphatase 98 10/13/2020 11:36 AM    Protein, total 8.4 (H) 10/13/2020 11:36 AM    Albumin 4.7 10/13/2020 11:36 AM    Globulin 3.7 10/13/2020 11:36 AM    A-G Ratio 1.3 10/13/2020 11:36 AM    ALT (SGPT) 54 10/13/2020 11:36 AM Assessment/Plan:  No diagnosis found. No orders of the defined types were placed in this encounter. Portal vein thrombosis/hypertension with gastric varices: F/U with GI. On 10/13/2020, WBC 5.9, H&H 15.2/47, platelet 154. BUN 11, creatinine 0.64. Normal PT/INR and APTT. Negative prothrombin gene mutation, and factor V Leiden as well as PNH work-up. Negative beta-2 glycoprotein-1 and cardiolipin antibodies. Lupus anticoagulant normal.  Patient is currently doing very well with no bleeding. She is not on anticoagulation. She is on baby aspirin which she is tolerating very well.     Thrombophilia/acquired thrombophilia/protein C deficiency: I am not convinced that she has protein C deficiency with her previous blood work result available. W/u for PNH and APLS negative    Gastric/esophageal varices(stable problem): F/U with GI    Chronic leukopenia: Resolved    RTC 6 months  have labs drawn prior to ROV  There are no Patient Instructions on file for this visit.        Armando Murry MD

## 2021-04-27 ENCOUNTER — VIRTUAL VISIT (OUTPATIENT)
Age: 71
End: 2021-04-27
Payer: MEDICARE

## 2021-04-27 DIAGNOSIS — D72.819 CHRONIC LEUKOPENIA: ICD-10-CM

## 2021-04-27 DIAGNOSIS — I81 PORTAL VEIN THROMBOSIS: Primary | ICD-10-CM

## 2021-04-27 DIAGNOSIS — I86.4 GASTRIC VARICES: ICD-10-CM

## 2021-04-27 DIAGNOSIS — K76.6 PORTAL HYPERTENSION WITH ESOPHAGEAL VARICES (HCC): ICD-10-CM

## 2021-04-27 DIAGNOSIS — I85.00 PORTAL HYPERTENSION WITH ESOPHAGEAL VARICES (HCC): ICD-10-CM

## 2021-04-27 PROBLEM — D70.9 NEUTROPENIA (HCC): Status: ACTIVE | Noted: 2021-04-27

## 2021-04-27 PROCEDURE — 99442 PR PHYS/QHP TELEPHONE EVALUATION 11-20 MIN: CPT | Performed by: INTERNAL MEDICINE

## 2021-04-27 NOTE — PROGRESS NOTES
Kasandra Yen is a 65 y.o. female who was seen by synchronous (real-time) audio technology on 2021. Hematology/Oncology  Progress Note     Name: Janay Lara  Date: 2021  : 1950     PCP: Jose Andrade M.D.      Ms. Lara is a 64 year old female who was seen for management of her acquired thrombophilic disorder. The patient also has portal hypertension with esophageal varices, coronary artery disease, and borderline diabetes mellitus.  She also has a documented protein C deficiency  Current therapy: None       Assessment & Plan:   Diagnoses and all orders for this visit:    1. Portal vein thrombosis    2. Gastric varices    3. Portal hypertension with esophageal varices (HCC)    4. Chronic leukopenia         The complexity of medical decision making for this visit is moderate       Portal vein thrombosis/hypertension with gastric varices: F/U with GI. On 10/13/2020, WBC 5.9, H&H 15.2/47, platelet 884. BUN 11, creatinine 0.64. Normal PT/INR and APTT. Negative prothrombin gene mutation, and factor V Leiden as well as PNH work-up. Negative beta-2 glycoprotein-1 and cardiolipin antibodies. Lupus anticoagulant normal.  Patient is currently doing very well with no bleeding. She is not on anticoagulation. She is on baby aspirin which she is tolerating very well.     Thrombophilia/acquired thrombophilia/protein C deficiency: I am not convinced that she has protein C deficiency with her previous blood work result available. W/u for PNH and APLS negative     Gastric/esophageal varices(stable problem): F/U with GI     Chronic leukopenia: Resolved     RTC 6 months  I spent at least 15 minutes on this visit with this established patient. 712  Subjective:   Mrs. Jenise Stephens is a 65-year-old [facility] American woman who has a thrombophilia disorder and  history of portal vein thrombosis. Ronel Anand has been doing well since she was last seen in clinic.  Currently, she denies having abdominal pain, nausea, emesis, or diarrhea.   Since her last clinic visit she did not have any additional complaints except for a recent fall. She tripped on a water and has some chest pain which is relieved by Voltaren, heat pad and ibuprofen. No need for blood clots. No bleeding. Only easy bruising chronically. All other point review of system have been reviewed and were negative. ECOG performance status 1. Independent with ADLs and IADLs    Prior to Admission medications    Medication Sig Start Date End Date Taking? Authorizing Provider   ZOVIRAX 5 % topical cream AMANDA EXT AA Q 4 H PRN 9/10/18   Provider, Historical   VENTOLIN HFA 90 mcg/actuation inhaler  7/3/18   Provider, Historical   rosuvastatin (CRESTOR) 10 mg tablet Take 10 mg by mouth nightly. Provider, Historical   ascorbic acid, vitamin C, (VITAMIN C) 500 mg tablet Take  by mouth. Provider, Historical   famotidine (PEPCID) 20 mg tablet Take 20 mg by mouth two (2) times a day. Provider, Historical   biotin 5,000 mcg TbDi Take  by mouth. Provider, Historical   nadolol (CORGARD) 20 mg tablet Take  by mouth daily. Provider, Historical   atorvastatin (LIPITOR) 20 mg tablet Take  by mouth daily. Provider, Historical   DOCOSAHEXANOIC ACID/EPA (FISH OIL PO) Take  by mouth. Provider, Historical   Flaxseed Oil Oil by Does Not Apply route. Provider, Historical   cyanocobalamin (VITAMIN B12) 1,000 mcg/mL injection 1,000 mcg by IntraMUSCular route once. Provider, Historical   cholecalciferol, vitamin D3, (VITAMIN D3) 2,000 unit Tab Take  by mouth. Provider, Historical   aspirin 81 mg chewable tablet Take 81 mg by mouth daily. Provider, Historical   montelukast (SINGULAIR) 10 mg tablet Take 10 mg by mouth daily. Provider, Historical   metoprolol-XL (TOPROL XL) 25 mg XL tablet Take  by mouth daily. Provider, Historical   docusate sodium (COLACE) 100 mg capsule Take 100 mg by mouth two (2) times a day.     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    Gastric varices I86.4    Acquired thrombophilia (formerly [facility]) D68.69    Anemia D64.9    TIA (transient ischemic attack) G45.9    Leukopenia D72.819    Borderline type 2 diabetes mellitus R73.03    Protein C deficiency (HCC) D68.59    Coronary artery disease I25.10    Portal hypertension with esophageal varices (formerly [facility]) K76.6, I85.00    Chronic leukopenia D72.819    Abdominal pain R10.9    Lymphadenopathy, axillary R59.0    Neutropenia (formerly [facility]) D70.9     Patient Active Problem List    Diagnosis Date Noted    Anemia 01/27/2015     Priority: 1 - One    TIA (transient ischemic attack) 01/27/2015     Priority: 1 - One    Neutropenia ([facility] [address] 04/27/2021    Lymphadenopathy, axillary 03/09/2018    Chronic leukopenia 07/26/2016    Abdominal pain 07/26/2016    Borderline type 2 diabetes mellitus 01/26/2016    Protein C deficiency ([facility] [address] 01/26/2016    Coronary artery disease 01/26/2016    Portal hypertension with esophageal varices ([facility] [address] 01/26/2016    Leukopenia 07/28/2015    Acquired thrombophilia ([facility] [address] 06/28/2013    Gastric varices 05/23/2012    Cardiac revascularization with aortocoronary bypass anastomosis - 2000 08/19/2011    Portal vein thrombosis     Myocardial infarction - 2000     Osteoporosis     Asthma     Coccidioidomycosis, pulmonary (HCC)      Current Outpatient Medications   Medication Sig Dispense Refill    ZOVIRAX 5 % topical cream AMANDA EXT AA Q 4 H PRN  1    VENTOLIN HFA 90 mcg/actuation inhaler       rosuvastatin (CRESTOR) 10 mg tablet Take 10 mg by mouth nightly.  ascorbic acid, vitamin C, (VITAMIN C) 500 mg tablet Take  by mouth.       famotidine (PEPCID) 20 mg tablet Take 20 mg by mouth two (2) times a day.      biotin 5,000 mcg TbDi Take  by mouth.  nadolol (CORGARD) 20 mg tablet Take  by mouth daily.  atorvastatin (LIPITOR) 20 mg tablet Take  by mouth daily.  DOCOSAHEXANOIC ACID/EPA (FISH OIL PO) Take  by mouth.  Flaxseed Oil Oil by Does Not Apply route.  cyanocobalamin (VITAMIN B12) 1,000 mcg/mL injection 1,000 mcg by IntraMUSCular route once.  cholecalciferol, vitamin D3, (VITAMIN D3) 2,000 unit Tab Take  by mouth.  aspirin 81 mg chewable tablet Take 81 mg by mouth daily.  montelukast (SINGULAIR) 10 mg tablet Take 10 mg by mouth daily.  metoprolol-XL (TOPROL XL) 25 mg XL tablet Take  by mouth daily.  docusate sodium (COLACE) 100 mg capsule Take 100 mg by mouth two (2) times a day. Allergies   Allergen Reactions    Demerol [Meperidine] Nausea and Vomiting     Low to awake from sleeping    Fentanyl Nausea and Vomiting     Low to awake from sleeping     No past medical history on file. Past Surgical History:   Procedure Laterality Date    HX ACL RECONSTRUCTION       No family history on file. Social History     Tobacco Use    Smoking status: Never Smoker    Smokeless tobacco: Never Used   Substance Use Topics    Alcohol use: Yes     Comment: ocassionally       ROS: As per HPI    Objective:   No flowsheet data found. General: alert, cooperative, no distress     Additional exam findings: We discussed the expected course, resolution and complications of the diagnosis(es) in detail. Medication risks, benefits, costs, interactions, and alternatives were discussed as indicated. I advised her to contact the office if her condition worsens, changes or fails to improve as anticipated. She expressed understanding with the diagnosis(es) and plan. Sharan Plata, was evaluated through a synchronous (real-time) audio-encounter. The patient (or guardian if applicable) is aware that this is a billable service.  Verbal consent to proceed has been obtained within the past 12 months. The visit was conducted pursuant to the emergency declaration under the [address] [place] [address] and the [employer] Act. Patient identification was verified, and a caregiver was present when appropriate. The patient was located in a state where the provider was credentialed to provide care.     Eusebia Salvador MD

## 2021-09-27 ENCOUNTER — TELEPHONE (OUTPATIENT)
Age: 71
End: 2021-09-27

## 2021-09-27 NOTE — TELEPHONE ENCOUNTER
Patient to be referred to [facility]. 15.  Patient aware she needs to sign a medical release form